# Patient Record
Sex: FEMALE | Race: WHITE | Employment: UNEMPLOYED | URBAN - METROPOLITAN AREA
[De-identification: names, ages, dates, MRNs, and addresses within clinical notes are randomized per-mention and may not be internally consistent; named-entity substitution may affect disease eponyms.]

---

## 2018-10-25 ENCOUNTER — OFFICE VISIT (OUTPATIENT)
Dept: NEUROLOGY | Age: 38
End: 2018-10-25

## 2018-10-25 VITALS
HEIGHT: 68 IN | WEIGHT: 133.2 LBS | SYSTOLIC BLOOD PRESSURE: 110 MMHG | DIASTOLIC BLOOD PRESSURE: 68 MMHG | BODY MASS INDEX: 20.19 KG/M2 | OXYGEN SATURATION: 99 % | HEART RATE: 83 BPM

## 2018-10-25 DIAGNOSIS — M25.20 JOINT LAXITY: Primary | ICD-10-CM

## 2018-10-25 DIAGNOSIS — M79.10 MYALGIA: ICD-10-CM

## 2018-10-25 DIAGNOSIS — G90.A POTS (POSTURAL ORTHOSTATIC TACHYCARDIA SYNDROME): ICD-10-CM

## 2018-10-25 RX ORDER — LEVOTHYROXINE SODIUM 112 UG/1
TABLET ORAL
COMMUNITY

## 2018-10-25 RX ORDER — FLUTICASONE PROPIONATE 50 MCG
2 SPRAY, SUSPENSION (ML) NASAL DAILY
COMMUNITY

## 2018-10-25 NOTE — PATIENT INSTRUCTIONS
A Healthy Lifestyle: Care Instructions  Your Care Instructions    A healthy lifestyle can help you feel good, stay at a healthy weight, and have plenty of energy for both work and play. A healthy lifestyle is something you can share with your whole family. A healthy lifestyle also can lower your risk for serious health problems, such as high blood pressure, heart disease, and diabetes. You can follow a few steps listed below to improve your health and the health of your family. Follow-up care is a key part of your treatment and safety. Be sure to make and go to all appointments, and call your doctor if you are having problems. It's also a good idea to know your test results and keep a list of the medicines you take. How can you care for yourself at home? · Do not eat too much sugar, fat, or fast foods. You can still have dessert and treats now and then. The goal is moderation. · Start small to improve your eating habits. Pay attention to portion sizes, drink less juice and soda pop, and eat more fruits and vegetables. ? Eat a healthy amount of food. A 3-ounce serving of meat, for example, is about the size of a deck of cards. Fill the rest of your plate with vegetables and whole grains. ? Limit the amount of soda and sports drinks you have every day. Drink more water when you are thirsty. ? Eat at least 5 servings of fruits and vegetables every day. It may seem like a lot, but it is not hard to reach this goal. A serving or helping is 1 piece of fruit, 1 cup of vegetables, or 2 cups of leafy, raw vegetables. Have an apple or some carrot sticks as an afternoon snack instead of a candy bar. Try to have fruits and/or vegetables at every meal.  · Make exercise part of your daily routine. You may want to start with simple activities, such as walking, bicycling, or slow swimming. Try to be active 30 to 60 minutes every day. You do not need to do all 30 to 60 minutes all at once.  For example, you can exercise 3 times a day for 10 or 20 minutes. Moderate exercise is safe for most people, but it is always a good idea to talk to your doctor before starting an exercise program.  · Keep moving. Raiza Lyle the lawn, work in the garden, or [facility]. Take the stairs instead of the elevator at work. · If you smoke, quit. People who smoke have an increased risk for heart attack, stroke, cancer, and other lung illnesses. Quitting is hard, but there are ways to boost your chance of quitting tobacco for good. ? Use nicotine gum, patches, or lozenges. ? Ask your doctor about stop-smoking programs and medicines. ? Keep trying. In addition to reducing your risk of diseases in the future, you will notice some benefits soon after you stop using tobacco. If you have shortness of breath or asthma symptoms, they will likely get better within a few weeks after you quit. · Limit how much alcohol you drink. Moderate amounts of alcohol (up to 2 drinks a day for men, 1 drink a day for women) are okay. But drinking too much can lead to liver problems, high blood pressure, and other health problems. Family health  If you have a family, there are many things you can do together to improve your health. · Eat meals together as a family as often as possible. · Eat healthy foods. This includes fruits, vegetables, lean meats and dairy, and whole grains. · Include your family in your fitness plan. Most people think of activities such as jogging or tennis as the way to fitness, but there are many ways you and your family can be more active. Anything that makes you breathe hard and gets your heart pumping is exercise. Here are some tips:  ? Walk to do errands or to take your child to school or the bus.  ? Go for a family bike ride after dinner instead of watching TV. Where can you learn more? Go to http://margie-cassie.info/. Enter B331 in the search box to learn more about \"A Healthy Lifestyle: Care Instructions. \"  Current as of: December 7, 2017  Content Version: 11.8  © 4308-3608 [employer]. Care instructions adapted under license by [employer] (which disclaims liability or warranty for this information). If you have questions about a medical condition or this instruction, always ask your healthcare professional. [address] any warranty or liability for your use of this information. Office Policies  o Phone calls/patient messages:  Please allow up to 24 hours for someone in the office to contact you about your call or message. Be mindful your provider may be out of the office or your message may require further review. We encourage you to use [employer] for your messages as this is a faster, more efficient way to communicate with our office  o Medication Refills:  Prescription medications require up to 48 business hours to process. We encourage you to use [employer] for your refills. For controlled medications: Please allow up to 72 business hours to process. Certain medications may require you to  a written prescription at our office. NO narcotic/controlled medications will be prescribed after 4pm Monday through Friday or on weekends  o Form/Paperwork Completion:  Please note there is a $25 fee for all paperwork completed by our providers. We ask that you allow 7-14 business days. Pre-payment is due prior to picking up/faxing the completed form. You may also download your forms to [employer] to have your doctor print off. Learning About Postural Orthostatic Tachycardia Syndrome (POTS)  What is POTS? Postural orthostatic tachycardia syndrome (POTS) is a fast heart rate (tachycardia) that starts after you stand up. This can suddenly happen as long as 10 minutes after you stand. What happens when you have POTS? With POTS, the body does not control blood pressure or heart rate as it should after you stand up.  So for a brief time, you may not get enough blood to your brain.  People with severe fatigue and dizziness may find it hard to keep up with daily living. But treatment can help. What are the symptoms? POTS can make you feel dizzy and lightheaded. You may faint. You may also feel tired. Blurred vision and feeling anxious are also symptoms. And you may have trouble with keeping your attention focused. Symptoms can range from mild to severe. Some things can make symptoms worse. These include heat, eating, exercise, showering, sitting too long, and menstrual cycle changes. When you first notice symptoms, sitting or lying down may help you feel better. What causes it? POTS may follow a viral illness, a surgery, pregnancy, bed rest, or a severe trauma. Experts don't understand what causes it, but different body systems seem to be out of balance. How is POTS diagnosed? To learn what is causing your symptoms, your doctor may:  · Ask about your symptoms, including when and how they started. · Check how your blood pressure and heart rate change when you move from lying down to sitting to standing. · Do a tilt table test. The test uses a special table that slowly tilts you to an upright position. It checks how your body responds when you change positions. How is POTS treated? Work with your doctor to find the right mix of treatments. These treatments may include:  · Taking medicine prescribed by your doctor. For some people, taking medicine that's normally used for high blood pressure can help. Taking medicine that keeps the body's fluids balanced may also help. · Everyday self-care. These practices can be a turner part of helping the body get back in balance. ? Drink plenty of fluids. For many people, low body fluid is part of what makes POTS symptoms worse. ? Eat the amount of salt your doctor tells you to. Salt helps keep up the body's fluid level. ? Try a special exercise program. Your doctor may give you a program of specific exercises.  You start short and slow, especially if fatigue is a problem. Add a little at a time. At first, you only do exercise when you're reclined. After a few weeks, you start to add upright exercise. ? Keep track of your symptoms and what makes them better and worse. When should you call for help? Call 911 anytime you think you may need emergency care. For example, call if:  · You passed out (lost consciousness), and it feels different than your typical episode or you don't recover as quickly as you have in the past.  Call your doctor now or seek immediate medical care if:  · Your symptoms are getting worse. For example, you are more dizzy or lightheaded. Watch closely for changes in your health, and be sure to contact your doctor if:  · You do not get better as expected. Follow-up care is a key part of your treatment and safety. Be sure to make and go to all appointments, and call your doctor if you are having problems. It's also a good idea to know your test results and keep a list of the medicines you take. Where can you learn more? Go to http://margie-cassie.info/. Enter D942 in the search box to learn more about \"Learning About Postural Orthostatic Tachycardia Syndrome (POTS). \"  Current as of: December 6, 2017  Content Version: 11.8  © 3367-3758 [employer]. Care instructions adapted under license by [employer] (which disclaims liability or warranty for this information). If you have questions about a medical condition or this instruction, always ask your healthcare professional. [place] [address] any warranty or liability for your use of this information.

## 2018-10-25 NOTE — PROGRESS NOTES
NEUROLOGY CLINIC NOTE    Patient ID:  Ky Pruitt  [id number]  56 y.o.  1980    Date of Consultation:  October 25, 2018    Reason for Consultation:  Question of [address] syndrome    Chief Complaint   Patient presents with    New Patient     ? [address] syndrome       History of Present Illness:     Past Medical History:   Diagnosis Date    Thyroid disorder      History reviewed. No pertinent surgical history. Prior to Admission medications    Medication Sig Start Date End Date Taking? Authorizing Provider   levothyroxine (SYNTHROID) 112 mcg tablet Take  by mouth Daily (before breakfast). Yes Provider, Historical   LORATADINE PO Take  by mouth. Yes Provider, Historical   fluticasone (FLONASE) 50 mcg/actuation nasal spray 2 Sprays by Both Nostrils route daily.    Yes Provider, Historical     Allergies   Allergen Reactions    Sudafed [Pseudoephedrine Hcl] Palpitations      Social History     Socioeconomic History    Marital status:      Spouse name: Not on file    Number of children: Not on file    Years of education: Not on file    Highest education level: Not on file   Social Needs    Financial resource strain: Not on file    Food insecurity - worry: Not on file    Food insecurity - inability: Not on file    Transportation needs - medical: Not on file   Mitro needs - non-medical: Not on file   Occupational History    Not on file   Tobacco Use    Smoking status: Never Smoker    Smokeless tobacco: Never Used   Substance and Sexual Activity    Alcohol use: No     Frequency: Never    Drug use: No    Sexual activity: Not on file   Other Topics Concern    Not on file   Social History Narrative    Not on file     Family History   Problem Relation Age of Onset    Cancer Maternal Grandfather     Stroke Maternal Grandfather     Cancer Paternal Grandfather     MS Mother           Subjective:      Ky Pruitt is a 43 y.o. ambidextrous female with history of thyroid disorder who is here for further evaluation regarding a concern if she has Nataliia Danlos Syndrome. Patient's daughter apparently getting worked by a genetics specialist for possible EDS. Patient is concerned whether she also has it. She mentions problems years ago with feeling exhausted always and having difficulty keeping a 40 hour work week. Eventually found to have hypothyroidism. Treatment with supplementation and condition improved. She recalls episode of half of her face going numb and losing her sense of taste. She saw Dr. Primitivo Cruz (neurologist). Noted then as well some random muscle jerking. Taught to be due to medications she was taking. She reports chronic neck and shoulder pain. Episodes of sense of imbalance briefly or wobbliness upon standing up and heart racing with coffee. No issues currently. Outside reports reviewed: none. Review of Systems:    A comprehensive review of systems was performed:   Constitutional: positive for fatigue  Eyes: positive for none  Ears, nose, mouth, throat, and face: positive for ringing in the ears, snoring  Respiratory: positive for none  Cardiovascular: positive for skipped beats  Gastrointestinal: positive for constipation  Genitourinary: positive for none  Integument/breast: positive for none  Hematologic/lymphatic: positive for none  Musculoskeletal: positive for none  Neurological: positive for none  Behavioral/Psych: positive for none  Endocrine: positive for none  Allergic/Immunologic: positive for none      Objective:     Visit Vitals  /68   Pulse 83   Ht 5' 8\" (1.727 m)   Wt 133 lb 3.2 oz (60.4 kg)   SpO2 99%   BMI 20.25 kg/m²       PHYSICAL EXAM:    General Appearance: Alert, patient appears stated age. General:  Well developed, well nourished patent in no apparent distress. Head/Face: The head is normocephalic and atraumatic. Eyes: Conjunctivae appear normal. Sclera appear normal and non-icteric. Nose (and Sinus):    No abnormality of the nose or sinuses is noted. Oral:   Throat is clear. Lymphatics:  No lymphadenopathy in the neck/head. Neck and Thyroid:   No bruits noted in the neck. Respiratory:  Lungs clear to auscultation. Cardiovascular:  Palpation and auscultation: regular rate and rhythm. Extremity: No joint swelling, erythema or pedal edema. NEUROLOGICAL EXAM:    Appearance: The patient is well developed, well nourished, provides a coherent history and is in no acute distress. Mental Status: Oriented to time, place and person. Fluent, no aphasia or dysarthria. Mood and affect appropriate. Cranial Nerves:   Intact visual fields. VA 20/25 OS and 20/20 OD without correction on near vision. Fundi are benign. DIA, EOM's full, no nystagmus, no ptosis. Facial sensation is normal. Corneal reflexes are intact. Facial movement is symmetric. Hearing is normal bilaterally. Palate is midline with normal elevation. Sternocleidomastoid and trapezius muscles are normal. Tongue is midline. Motor:  5/5 strength in upper and lower proximal and distal muscles. Normal bulk and tone. No fasciculations. Reflexes:   Deep tendon reflexes 2+/4 and symmetrical. Downgoing toes. Sensory:   Normal to touch, pinprick and vibration. Gait:  Normal gait. No Romberg. Can do heel/toe/tandem walking. Tremor:   No tremor noted. Cerebellar:  Intact FTN/JUAN/HTS. Neurovascular:  Normal heart sounds and regular rhythm, peripheral pulses intact, and no carotid bruits.     Cervical range of motion measurement:       Degrees   Head flexion   70   Head extension  75   Right lateral head flexion 25   Left lateral head flexion 60   Right head rotation  75   Left head rotation  90    Mild restriction right head rotation and moderate restriction right lateral head flexion  Anterior head posture with shoulders rotated in        Assessment:   Joint laxity  Myalgia  POTS    Plan:   Neurological examination is nonfocal.  Patient exhibits some joint laxity. Patient also prone to myalgia. Exam reveals anterior head posture with restriction and right lateral head flexion and head rotation on range of motion measurements. Advised to correct her anterior head posture as this may increase her risk to develop cervicogenic headaches. Given that her daughter is currently being worked up, it is only prudent that the patient also be evaluated for possible Nataliia Danlos syndrome. Patient was referred to [facility] genetics care of Dr. Sirena Duron for further evaluation. Supine blood pressure was 128/86 with a heart rate of 76. Sitting blood pressure of 124/92 with a heart rate of 86. Standing blood pressure was 118/91 with a heart rate of 98. There was a 22 point increase in patient's heart rate from supine to standing. Only a 10 point drop in systolic blood pressure. Need to consider given her other symptoms of possible postural orthostatic tachycardia syndrome. Patient was referred to Dr. Ange Baig with [facility] nephrology for further evaluation. No further workup currently from a neurological standpoint. Patient to return to clinic as needed. Visit lasted 45 minutes.   Greater than 50% was spent discussing her condition, potential etiology, prognosis, orthostatic testing and its implication, discussion about Jenna Knuckles syndrome, referral to [facility] genetics and referral to Dr. Ange Baig for possible POTS

## 2018-10-31 ENCOUNTER — DOCUMENTATION ONLY (OUTPATIENT)
Dept: NEUROLOGY | Age: 38
End: 2018-10-31

## 2018-10-31 NOTE — PROGRESS NOTES
Faxed on 10/30/2018 to Dr. Mayra Piña at [phone] Referral Requisition, Clinic Note, Patient Registration, Insurance Card Image

## 2018-10-31 NOTE — PROGRESS NOTES
Faxed on 10/30/2018 to Dr. Pita Good at [phone] Referral Requisition, Clinic Note, Patient Registration, Insurance Card Image.

## 2018-11-05 ENCOUNTER — TELEPHONE (OUTPATIENT)
Dept: NEUROLOGY | Age: 38
End: 2018-11-05

## 2018-11-05 NOTE — TELEPHONE ENCOUNTER
Received call from patient, she stated that at her last appt with Dr. Fatuma Knight, he referred her to two doctors. She said that Dr. Suyapa Kearney office cannot get her in until November 2019, and Dr. Sarahi Triplett office cannot get her in until March 2019. She wants to know if it's possible for someone to call to see if she can be seen sooner, or can Dr. Fatuma Knight refer her to other doctors.       [phone]

## 2018-11-08 NOTE — TELEPHONE ENCOUNTER
Dr. Afia Mccormack spoke with patient and she is willing to go to [facility] for the [facility] and [facility] at [facility] for the POTS. Please advise.

## 2018-11-09 DIAGNOSIS — G90.A POTS (POSTURAL ORTHOSTATIC TACHYCARDIA SYNDROME): ICD-10-CM

## 2018-11-09 DIAGNOSIS — M24.9 HYPERMOBILITY OF JOINT: Primary | ICD-10-CM

## 2018-11-12 ENCOUNTER — TELEPHONE (OUTPATIENT)
Dept: NEUROLOGY | Age: 38
End: 2018-11-12

## 2018-11-12 NOTE — TELEPHONE ENCOUNTER
----- Message from Beronica Toussaint sent at 11/12/2018  8:52 AM EST -----  Regarding: Dr. Nicolas Dominguez  The patient is requesting a call back with the status of the appointments that are supposed to be schedule for her out of town.  [phone]

## 2018-11-13 ENCOUNTER — TELEPHONE (OUTPATIENT)
Dept: NEUROLOGY | Age: 38
End: 2018-11-13

## 2018-11-13 NOTE — TELEPHONE ENCOUNTER
Spoke with patient and I was unable to make her appointment, but I am mailing out to patient a copy of both referrals and information attach to each test she is going to have done.

## 2018-11-14 ENCOUNTER — TELEPHONE (OUTPATIENT)
Dept: NEUROLOGY | Age: 38
End: 2018-11-14

## 2018-11-14 ENCOUNTER — DOCUMENTATION ONLY (OUTPATIENT)
Dept: NEUROLOGY | Age: 38
End: 2018-11-14

## 2018-11-14 DIAGNOSIS — G90.A POTS (POSTURAL ORTHOSTATIC TACHYCARDIA SYNDROME): Primary | ICD-10-CM

## 2018-11-14 NOTE — TELEPHONE ENCOUNTER
Spoke with patient and I will let Dr. Vignesh Veras known that [facility] (POTS) has no opening unitl November 2019.

## 2018-11-14 NOTE — PROGRESS NOTES
Faxed on 11/12/2018 to Laureen Shepherd Dr at [phone] Referral Requisition, Clinic Note, [employer], Patient Registration.

## 2018-11-14 NOTE — TELEPHONE ENCOUNTER
----- Message from [facility] sent at 11/14/2018  8:38 AM EST -----  Regarding: Dr Andrea Joshi telephone  The pt called and stated that both POTS specialist are booked out until November 2019.       Best contact number is [phone]

## 2018-11-14 NOTE — PROGRESS NOTES
Faxed on 11/12/2018 to [address] [place] [address] at [phone] Referral Requisition, Clinic Note, [employer], Patient Registration.

## 2018-11-14 NOTE — TELEPHONE ENCOUNTER
Dr. Goodrich Bolds patient states that Raphael Terry and [address] for (POTS) will not be available until November 2019. Please advise.

## 2018-11-16 NOTE — TELEPHONE ENCOUNTER
Dr. Charlie Dimas whom would you like to refer patient to because [facility] have not appointment until November 2019. Please advise.

## 2018-11-16 NOTE — TELEPHONE ENCOUNTER
Spoke with patient per Dr. Bea Segura I will send to autonomic testing at the Neurology clinic at [facility] and than see what the testing results show and likely refer her to cardiologist. Patient understood.

## 2018-11-16 NOTE — TELEPHONE ENCOUNTER
Please tell patient I will send her for autonomic testing at the Neurology clinic at [facility] and then see what the testing results show and likely refer her to a cardiologist.

## 2018-11-26 ENCOUNTER — TELEPHONE (OUTPATIENT)
Dept: NEUROLOGY | Age: 38
End: 2018-11-26

## 2018-11-26 NOTE — TELEPHONE ENCOUNTER
Spoke with patient; patient is concerned about not receiving a phone call to schedule an appointment.      Spoke with Dr. Álvaro Marte and he stated the referral is supposed to go to Dr. Niko Delgado   Physician   Specialty   Neurology   Niko Delgado   Physician   Primary Contact Information     Phone Fax E-mail Address   [phone] Not available Niurka On license of [facility] [address] [address]

## 2018-11-30 ENCOUNTER — TELEPHONE (OUTPATIENT)
Dept: NEUROLOGY | Age: 38
End: 2018-11-30

## 2018-11-30 NOTE — TELEPHONE ENCOUNTER
----- Message from Maksim Carson sent at 11/30/2018 10:15 AM EST -----  Regarding: Dr Melly Conte telephone  The pt is requesting a callback, because she wants an update on the testing needed. She is needing to know if someone spoke with her insurance regarding approval; also the insurance told her a call would need to be made because they aren't accepting faxes.       Best contact number is [phone]

## 2018-12-05 ENCOUNTER — TELEPHONE (OUTPATIENT)
Dept: NEUROLOGY | Age: 38
End: 2018-12-05

## 2018-12-05 NOTE — TELEPHONE ENCOUNTER
Called and spoke to patient scheduled ans testing   Thursday, December 13, 2018 01:00 PM  Instructions given to hold all oral medications for 48 hrs   Patient verbalized understanding

## 2018-12-05 NOTE — TELEPHONE ENCOUNTER
----- Message from Khushi Cristina sent at 12/5/2018 12:50 PM EST -----  Regarding: Dr. Kaushik Castaneda   Pt requested a call back with clarification on requirements and details that she should be aware of prior to ANS testing next week. Best contact [phone].

## 2018-12-05 NOTE — TELEPHONE ENCOUNTER
----- Message from Ember Mohr sent at 12/5/2018  2:51 PM EST -----  Regarding: Dr Dane Kidd, [facility], is following up on a pre-certification for ANS testing, provider has informed pt that it is not needed, but they have a pending Pre-Certification on file. Best contact [phone]-  Please leave detailed message if not available.

## 2018-12-11 ENCOUNTER — TELEPHONE (OUTPATIENT)
Dept: NEUROLOGY | Age: 38
End: 2018-12-11

## 2018-12-11 NOTE — TELEPHONE ENCOUNTER
Patient called stating that she received phone call from the benefits department for her insurance company stating that the ANS testing was going to be denied by her insurance, but could do an appeal for the PA. Patient was told that the PA was not required. Patient's ANS is scheduled for 12/13/13 @ 1:00p. Patient stated that if insurance will not cover it even after appeal, she would rather just get it done and pay full price. Wanted to know what out of pocket cost would be. Has f/u with Dr. Joleen Manuel at [facility] to go over results of test. Please call patient as soon as possible with update on PA/cost of procedure.  [phone]

## 2018-12-11 NOTE — TELEPHONE ENCOUNTER
----- Message from Rubén Cobianronak sent at 12/11/2018  1:29 PM EST -----  Regarding: Dr. Connell Labor  Pt would like to discuss ans testing being denied. She has few questions. [phone]. Insurance stated they are denying it, and marked it unnecessary. She would  like to know what's going on,stated  and the office and insurance telling her different things.

## 2018-12-12 NOTE — TELEPHONE ENCOUNTER
Called and spoke to patient insurance co states no pa   She states understanding   Gave her the cash cost approx $783  Patient states she will continue with appt  Even if she had to pay cash and insurance wont pay

## 2018-12-13 ENCOUNTER — OFFICE VISIT (OUTPATIENT)
Dept: NEUROLOGY | Age: 38
End: 2018-12-13

## 2018-12-13 ENCOUNTER — TELEPHONE (OUTPATIENT)
Dept: NEUROLOGY | Age: 38
End: 2018-12-13

## 2018-12-13 DIAGNOSIS — R00.0 TACHYCARDIA, UNSPECIFIED: Primary | ICD-10-CM

## 2018-12-13 NOTE — LETTER
2018 5:57 PM 
 
Patient:  Jeramie Brumfield YOB: 1980 Date of Visit: 2018 Dear Abbe Gallagher MD 
[address] [place] [address] [address] [address] VIA Facsimile: [phone] Colin Jackson MD 
[address] [facility] [address] [address] VIA In Basket Emil Velasquez MD 
[address] [address] VIA Facsimile: [phone] 
 : Thank you for referring Ms. Sebastián Faye to me for ANS testing. [facility] [place] [address], [address] [place], [address] [place] [address] Phone: [phone] FAX: [phone] Clinical Autonomic Testing Report Patient ID: 
Jeramie Brumfield [id number] 
43 y.o. 
1980 REFERRED BY: Yara Arroyo MD 
PCP: Taz Tucker MD 
 
Date of Testin2018 Indication/History:   
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 Patient also has generalized fatigue with palpitations Patient is coming for syncope/autonomic dysfunction evaluation. Medications taken 48 hrs before the test: None Procedure: This Autonomic Nervous System (ANS) testing is performed by utilizing [address] [place] [address] Autonomic System, with established protocol. Multiple procedures performed: Heart rate response to deep breathing (HRDB), Valsalva ratio, Heart rate and BP response to head up tilt (HUT), and Quantitative sudomotor axon reflex testing (QSWEAT) . Result: 1. Heart response to deep  breathing (HRDB): 2 series of 6 cycles were performed and the mean of 6 consecutive cycles was obtained. Average HR difference was 27.6 and E:I ratio was 1.40. Normal response 2. Heart rate response to Valsalva maneuver:  hwka-mc-[id number] BP to Valsalva was measured and BP response in all 4 phases was normal. Heart response was the opposite of BP, a normal response. ( VR = 1.80 ) 3.  HUT (head-up tilt) : Zifr-md-viwi BP and HR were measured, up to 15 minutes post tilt. There is an exaggerated sustained increase in heart rate greater than 30 beats/min (from baseline 78.4 to max 122) associated with dizziness without accompanying hypotension within the first 10 mins of tilt table testing 4. SUDOMOTOR: QSWEAT response showed reduced sweat sweat production in the proximal leg comparing patient to age group. Impression: ABNORMAL 
  
1) There is an exaggerated sustained increase in heart rate greater than 30 beats/min  (from baseline 78.4 to max 122) associated with dizziness without accompanying hypotension within the first 10 mins of tilt table testing consistent with postural orthostatic tachycardia syndrome (POTS)   
2) Reduced sweat production in the proximal leg which suggests a neuropathic type of POTS. Adrienne Simms MD 
Diplomate, American Board of Psychiatry and Neurology Diplomate, Neuromuscular Medicine Diplomate, [address] [place] [address] Board of Electrodiagnostic Medicine Note: Raw Data will be scanned separately in Media

## 2018-12-13 NOTE — TELEPHONE ENCOUNTER
Received call from patient, she stated that she had testing does at [facility], and would like a call back with the results. She also said that she needs the results printed out so she can take them to another upcoming appt that she has.       [phone]

## 2018-12-13 NOTE — PROCEDURES
[facility]  [address] [place], [address] [place], [address] [facility]  Phone: [phone]  FAX: [phone]     Clinical Autonomic Testing Report     Patient ID:  Esvin Martinez  [id number]  66 y.o.  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 Patient also has generalized fatigue with palpitations    Patient is coming for syncope/autonomic dysfunction evaluation. Medications taken 48 hrs before the test: None     Procedure: This Autonomic Nervous System (ANS) testing is performed by utilizing [address] [place] [address] [facility] Autonomic System, with established protocol. Multiple procedures performed: Heart rate response to deep breathing (HRDB), Valsalva ratio, Heart rate and BP response to head up tilt (HUT), and Quantitative sudomotor axon reflex testing (QSWEAT) . Result:  1. Heart response to deep  breathing (HRDB): 2 series of 6 cycles were performed and the mean of 6 consecutive cycles was obtained. Average HR difference was 27.6 and E:I ratio was 1.40. Normal response  2. Heart rate response to Valsalva maneuver:  khwe-pn-mzjx BP to Valsalva was measured and BP response in all 4 phases was normal. Heart response was the opposite of BP, a normal response. ( VR = 1.80 )  3. HUT (head-up tilt) : Jcpm-dl-gfek BP and HR were measured, up to 15 minutes post tilt. There is an exaggerated sustained increase in heart rate greater than 30 beats/min (from baseline 78.4 to max 122) associated with dizziness without accompanying hypotension within the first 10 mins of tilt table testing   4. SUDOMOTOR: QSWEAT response showed reduced sweat sweat production in the proximal leg comparing patient to age group.      Impression:   ABNORMAL     1) There is an exaggerated sustained increase in heart rate greater than 30 beats/min (from baseline 78.4 to max 122) associated with dizziness without accompanying hypotension within the first 10 mins of tilt table testing consistent with postural orthostatic tachycardia syndrome (POTS)     2) Reduced sweat production in the proximal leg which suggests a neuropathic type of POTS.          Liliana Perez MD  Diplomate, American Board of Psychiatry and Neurology  Diplomate, Neuromuscular Medicine  Diplomate, American Board of Electrodiagnostic Medicine    Note: Raw Data will be scanned separately in Media

## 2018-12-14 NOTE — TELEPHONE ENCOUNTER
Called patient today and informed her that the autonomic testing was positive for POTS. She is scheduled to be evaluated at [facility]. She will pass by a copy of the autonomic testing either today or Monday.

## 2021-12-03 ENCOUNTER — TRANSCRIBE ORDER (OUTPATIENT)
Dept: SCHEDULING | Age: 41
End: 2021-12-03

## 2021-12-03 DIAGNOSIS — R19.4 FREQUENT BOWEL MOVEMENTS: Primary | ICD-10-CM

## 2021-12-03 DIAGNOSIS — K59.00 CONSTIPATION: ICD-10-CM

## 2022-08-22 ENCOUNTER — TRANSCRIBE ORDER (OUTPATIENT)
Dept: SCHEDULING | Age: 42
End: 2022-08-22

## 2022-08-22 DIAGNOSIS — K59.00 CONSTIPATION: ICD-10-CM

## 2022-08-22 DIAGNOSIS — R19.4 FREQUENT BOWEL MOVEMENTS: Primary | ICD-10-CM

## 2024-02-12 ENCOUNTER — OFFICE VISIT (OUTPATIENT)
Age: 44
End: 2024-02-12
Payer: COMMERCIAL

## 2024-02-12 VITALS
HEART RATE: 93 BPM | BODY MASS INDEX: 20.95 KG/M2 | HEIGHT: 68 IN | SYSTOLIC BLOOD PRESSURE: 118 MMHG | WEIGHT: 138.2 LBS | DIASTOLIC BLOOD PRESSURE: 85 MMHG

## 2024-02-12 DIAGNOSIS — E03.9 ACQUIRED HYPOTHYROIDISM: Primary | ICD-10-CM

## 2024-02-12 DIAGNOSIS — E55.9 VITAMIN D DEFICIENCY: ICD-10-CM

## 2024-02-12 PROCEDURE — 99204 OFFICE O/P NEW MOD 45 MIN: CPT | Performed by: GENERAL ACUTE CARE HOSPITAL

## 2024-02-12 RX ORDER — CALCIUM CARBONATE 500(1250)
500 TABLET ORAL DAILY
COMMUNITY

## 2024-02-12 RX ORDER — ASCORBIC ACID 500 MG
500 TABLET ORAL DAILY
COMMUNITY

## 2024-02-12 RX ORDER — CHOLECALCIFEROL (VITAMIN D3) 125 MCG
500 CAPSULE ORAL DAILY
COMMUNITY

## 2024-02-12 RX ORDER — LEVOTHYROXINE SODIUM 0.12 MG/1
TABLET ORAL
COMMUNITY

## 2024-02-12 RX ORDER — MULTIVITAMIN WITH IRON
100 TABLET ORAL DAILY
COMMUNITY

## 2024-02-12 RX ORDER — LORATADINE 10 MG/1
10 TABLET ORAL DAILY
COMMUNITY

## 2024-02-12 RX ORDER — CHOLECALCIFEROL (VITAMIN D3) 50 MCG
2000 TABLET ORAL DAILY
Qty: 30 TABLET | Refills: 11 | Status: SHIPPED | OUTPATIENT
Start: 2024-02-12

## 2024-02-12 RX ORDER — LUTEIN 6 MG
TABLET ORAL
COMMUNITY

## 2024-02-12 RX ORDER — VITAMIN E 268 MG
400 CAPSULE ORAL DAILY
COMMUNITY

## 2024-02-12 NOTE — PROGRESS NOTES
[facility]  DR ELIZABETH FLORES         Yamini Torres is a 44 y.o. female  has a past medical history of Acquired hypothyroidism, Endocrine disease, Hypothyroid, and Thyroid disorder.     Presents for : Hypothyroidism    ASSESSMENT AND PLAN:     Hypothyroidism    Today, we spent time discussing the physiologic mechanisms which govern thyroid hormone regulation and the normal responses to abnormal thyroid function. We also discussed their current condition in the setting of this physiologic response.     Today we will check a TSH & FT4 level to determine if patient is euthyroid on current regimen.  Continue with 125 mcg of levothyroxine daily.  Discussed the best way to take thyroid hormone each morning.    Vitamin D deficiency   Per patient last labs showing Vitamin D deficiency, taking only 400 units at this time OTC, advised to obtain labs today and take 2000 units daily    Plan to RTC in 4 months with prelabs    -----------------------------------------------------------------------------------------------------------------------------------------------------      HISTORY OF PRESENT ILLNESS:   Yamini Torres is a 44 y.o. female with a PMHx as noted below who was referred to our endocrinology clinic for evaluation of hypothyroidism.    Thyroid History:  Diagnosed since she was 18 years old  Patient denies  history of trauma/surgery   Currently taking levothyroxine 125 mcg daily   The patient admits to taking it 1 hour before breakfast on an empty stomach, not drinking water with it thought, swallows it dry  Since last 3 years has been on laxatives for constipation, she has constipation for her entire life, despite staying well hydrated, had colonoscopy. Also was placed on linzess that did not work, would use for about 1 month and discontinue it as she did not see results.   Last blood test checked by PCP due to worsening symptoms, last done on Fall 2023, we do not have report

## 2024-02-12 NOTE — PATIENT INSTRUCTIONS
Take levothyroxine 125 mcg daily   Obtain labs today   Obtain labs 1 week prior to next visit     Levothyroxine medication instructions:  Please take levothyroxine with a glass of water only, on an empty stomach each morning, 1 hour prior to ingesting any other medications (including vitamins), food, coffee, tea, juice or any other beverages.   If you use antacids, medicine to treat high cholesterol (including cholestyramine, colesevelam, colestipol), orlistat, sevelamer, sucralfate, stomach medicine (including lansoprazole, omeprazole, pantoprazole), or any medicine that contains calcium or iron, please take it at least 4 hours before or 4 hours after you take levothyroxine.  Cottonseed meal, dietary fiber, soybean flour or walnuts may decrease the absorption of this medicine.  All of these can reduce the absorption of thyroid hormone tablets and result in fluctuating levels in the blood and on labs, affecting also how one feels.  Please do not eat grapefruit or drink grapefruit juice while you are using this medicine.  If you miss a dose you can take it as soon as you remember. However, if it is almost time for your next dose, wait until then and take a regular dose. Do not take extra medicine to make up for a missed dose.  Store the medicine in a closed container at room temperature, away from heat, moisture, and direct light. Please keep out of children's reach.  You may have to take this medicine for 6 to 8 weeks before your symptoms start to get better.

## 2024-02-13 ENCOUNTER — OFFICE VISIT (OUTPATIENT)
Age: 44
End: 2024-02-13
Payer: COMMERCIAL

## 2024-02-13 VITALS
HEIGHT: 68 IN | WEIGHT: 134.6 LBS | HEART RATE: 93 BPM | DIASTOLIC BLOOD PRESSURE: 60 MMHG | RESPIRATION RATE: 17 BRPM | TEMPERATURE: 97.4 F | OXYGEN SATURATION: 100 % | BODY MASS INDEX: 20.4 KG/M2 | SYSTOLIC BLOOD PRESSURE: 125 MMHG

## 2024-02-13 DIAGNOSIS — E11.42 DIABETIC PERIPHERAL NEUROPATHY ASSOCIATED WITH TYPE 2 DIABETES MELLITUS (HCC): ICD-10-CM

## 2024-02-13 DIAGNOSIS — G56.03 CARPAL TUNNEL SYNDROME, BILATERAL UPPER LIMBS: ICD-10-CM

## 2024-02-13 DIAGNOSIS — R93.0 ABNORMAL MRI OF HEAD: ICD-10-CM

## 2024-02-13 DIAGNOSIS — M48.02 DEGENERATIVE CERVICAL SPINAL STENOSIS: ICD-10-CM

## 2024-02-13 DIAGNOSIS — D89.89 IMMUNE-MEDIATED NEUROPATHY (HCC): ICD-10-CM

## 2024-02-13 DIAGNOSIS — E53.8 B12 DEFICIENCY: ICD-10-CM

## 2024-02-13 DIAGNOSIS — I65.23 BILATERAL CAROTID ARTERY STENOSIS: ICD-10-CM

## 2024-02-13 DIAGNOSIS — Z51.81 THERAPEUTIC DRUG MONITORING: ICD-10-CM

## 2024-02-13 DIAGNOSIS — H93.11 TINNITUS AURIUM, RIGHT: ICD-10-CM

## 2024-02-13 DIAGNOSIS — K59.2 CONSTIPATION DUE TO NEUROGENIC BOWEL: ICD-10-CM

## 2024-02-13 DIAGNOSIS — G63 IMMUNE-MEDIATED NEUROPATHY (HCC): ICD-10-CM

## 2024-02-13 DIAGNOSIS — R53.1 GENERALIZED WEAKNESS: ICD-10-CM

## 2024-02-13 DIAGNOSIS — K59.00 CONSTIPATION DUE TO NEUROGENIC BOWEL: ICD-10-CM

## 2024-02-13 DIAGNOSIS — M47.22 CERVICAL RADICULOPATHY DUE TO DEGENERATIVE JOINT DISEASE OF SPINE: Primary | ICD-10-CM

## 2024-02-13 PROCEDURE — 99205 OFFICE O/P NEW HI 60 MIN: CPT | Performed by: PSYCHIATRY & NEUROLOGY

## 2024-02-14 NOTE — PROGRESS NOTES
Consult  REFERRED BY:  Avtar Cronin MD    CHIEF COMPLAINT: Patient with numbness and tingling in her arms and legs, pulsatile tinnitus in the right ear, and abnormal MRI scan of the brain in the past.      Subjective:     Yamini Torres is a 44 y.o. right-handed  female we are seeing as a new patient, at the request of Dr. Cronin for multiple neurological complaints that she has had for the last 20 years.  She apparently had some arm spasticity and weakness and numbness in her arms and legs, and was seen by Dr. Liam Albarran at [facility] 20 some years ago, and apparently was told that she had 2 lesions on her brain, but never had follow-up for that, and has no idea what they were.  She is also complaining of generalized weakness and fatigue, and feels that she was tried on 2 antidepressants thinking that was the cause of her symptoms, but then they found that her thyroid was low and she is currently on thyroid.  She still feels funny feelings in her arms, but no longer has the spasticity she says, but is concerned that the antidepressants they gave her caused some damage.  She has normal bladder function but says her bowels do not move and she is chronically constipated and GI cannot figure out why we want to do a gastric motility test and we advised her they probably could to see if she has some type of gastroparesis.  She has numbness and tingling in her hands and feet right frequently, and has a lot of neck pain and her neck pops in and out of place, and she is convinced she has something wrong with her neck and her brother has the same problem.  She has significant headaches, and is concerned about the lesions in her brain and her exposure to antidepressants in the past.  She has pulsatile tinnitus in her right ear in addition.  She feels dizzy and off balance quite frequently.  She had previously seen Dr. Massey to slow years ago, but those records are not available.  She had

## 2024-02-23 ENCOUNTER — TELEPHONE (OUTPATIENT)
Age: 44
End: 2024-02-23

## 2024-02-23 NOTE — TELEPHONE ENCOUNTER
Patient would like a call back to discuss corrects for the [employer] portal. Pt states that there a names of doctor that she has never seen before listed. Please call [phone]

## 2024-02-27 NOTE — TELEPHONE ENCOUNTER
[facility] corrects were made.  Call just an FYI to let her know.    Eric Barth MD sent to Margarita Contreras LPN  Caller: Unspecified (4 days ago,  1:55 PM)    Margarita, tell her the Dr. Mitch contreras was a dictation error, per Dr. Maguire and I have corrected that, and the handedness

## 2024-02-27 NOTE — TELEPHONE ENCOUNTER
Spoke with patient.  Verified patient with two patient identifiers.    States she wants to have the corrections made in her chart.     She is left handed.   She has seen Dr. Liam Albarran (which is noted)  She has seen Dr. Brower years ago  She did not see Dr. Massey    Also had symptoms in her 20's   Was on two different anti-depressants which she only took each one for 2 two weeks.  Had spasticity in arms which lasted a year.  Had loss of feeling on one side of mouth.  She later realized this was found to be her thyroid and she did not need the anti-depressants.  She recently watched a [employer] which advised her to call them if she had side effects from the anti-depressants, but now was told it was her thyroid, not an allergic reaction.  States her chart states there is a history of anti-depressants and she wonders if this really qualifies as being a history since she only took them for 2 wks.  Will forward to Dr. Barth.    Patient verbalized understanding.

## 2024-03-14 LAB
25(OH)D3+25(OH)D2 SERPL-MCNC: 31.4 NG/ML (ref 30–100)
CENTROMERE B AB SER-ACNC: <0.2 AI (ref 0–0.9)
CHROMATIN AB SERPL-ACNC: <0.2 AI (ref 0–0.9)
CK SERPL-CCNC: 34 U/L (ref 32–182)
DSDNA AB SER-ACNC: <1 IU/ML (ref 0–9)
ENA JO1 AB SER-ACNC: <0.2 AI (ref 0–0.9)
ENA RNP AB SER-ACNC: 0.2 AI (ref 0–0.9)
ENA SCL70 AB SER-ACNC: <0.2 AI (ref 0–0.9)
ENA SM AB SER-ACNC: <0.2 AI (ref 0–0.9)
ENA SM+RNP AB SER-ACNC: <0.2 AI (ref 0–0.9)
ENA SS-A AB SER-ACNC: <0.2 AI (ref 0–0.9)
ENA SS-B AB SER-ACNC: <0.2 AI (ref 0–0.9)
ERYTHROCYTE [SEDIMENTATION RATE] IN BLOOD BY WESTERGREN METHOD: 4 MM/HR (ref 0–32)
FOLATE SERPL-MCNC: 5.1 NG/ML
HBA1C MFR BLD: 5.5 % (ref 4.8–5.6)
Lab: NORMAL
RIBOSOMAL P AB SER-ACNC: <0.2 AI (ref 0–0.9)
T4 FREE SERPL-MCNC: 1.92 NG/DL (ref 0.82–1.77)
TSH SERPL DL<=0.005 MIU/L-ACNC: 0.9 UIU/ML (ref 0.45–4.5)
VIT B12 SERPL-MCNC: 748 PG/ML (ref 232–1245)

## 2024-03-15 LAB
THYROGLOB AB SERPL-ACNC: <1 IU/ML (ref 0–0.9)
THYROPEROXIDASE AB SERPL-ACNC: 48 IU/ML (ref 0–34)

## 2024-03-19 ENCOUNTER — TRANSCRIBE ORDERS (OUTPATIENT)
Facility: HOSPITAL | Age: 44
End: 2024-03-19

## 2024-03-19 DIAGNOSIS — N64.4 BREAST PAIN: Primary | ICD-10-CM

## 2024-03-19 LAB
ALBUMIN SERPL ELPH-MCNC: 4.2 G/DL (ref 2.9–4.4)
ALBUMIN/GLOB SERPL: 1.5 {RATIO} (ref 0.7–1.7)
ALPHA1 GLOB SERPL ELPH-MCNC: 0.2 G/DL (ref 0–0.4)
ALPHA2 GLOB SERPL ELPH-MCNC: 0.7 G/DL (ref 0.4–1)
B-GLOBULIN SERPL ELPH-MCNC: 1 G/DL (ref 0.7–1.3)
GAMMA GLOB SERPL ELPH-MCNC: 1.2 G/DL (ref 0.4–1.8)
GLOBULIN SER-MCNC: 3 G/DL (ref 2.2–3.9)
IGA SERPL-MCNC: 218 MG/DL (ref 87–352)
IGG SERPL-MCNC: 1059 MG/DL (ref 586–1602)
IGM SERPL-MCNC: 200 MG/DL (ref 26–217)
INTERPRETATION SERPL IEP-IMP: NORMAL
LABORATORY COMMENT REPORT: NORMAL
M PROTEIN SERPL ELPH-MCNC: NORMAL G/DL
PROT SERPL-MCNC: 7.2 G/DL (ref 6–8.5)

## 2024-03-20 LAB
ANTI NEURONAL CELL AB METHOD: NORMAL
INTERPRETATION: NORMAL
INTERPRETATION: NORMAL
MAG IGM SER-ACNC: <900 BTU (ref 0–999)
NEURONAL AB SER IA-ACNC: 17 UNITS (ref 0–54)

## 2024-03-21 ENCOUNTER — TELEPHONE (OUTPATIENT)
Age: 44
End: 2024-03-21

## 2024-03-21 NOTE — TELEPHONE ENCOUNTER
Patient called her her lab results.  She stated that she can see they posted on line and some of her numbers are elevated.  She would like to know what it means and what is the next step?

## 2024-03-22 ENCOUNTER — PATIENT MESSAGE (OUTPATIENT)
Age: 44
End: 2024-03-22

## 2024-03-25 ENCOUNTER — HOSPITAL ENCOUNTER (OUTPATIENT)
Facility: HOSPITAL | Age: 44
Discharge: HOME | End: 2024-03-28
Attending: PSYCHIATRY & NEUROLOGY
Payer: COMMERCIAL

## 2024-03-25 DIAGNOSIS — R93.0 ABNORMAL MRI OF HEAD: ICD-10-CM

## 2024-03-25 DIAGNOSIS — R53.1 GENERALIZED WEAKNESS: ICD-10-CM

## 2024-03-25 DIAGNOSIS — M48.02 DEGENERATIVE CERVICAL SPINAL STENOSIS: ICD-10-CM

## 2024-03-25 DIAGNOSIS — M47.22 CERVICAL RADICULOPATHY DUE TO DEGENERATIVE JOINT DISEASE OF SPINE: ICD-10-CM

## 2024-03-25 DIAGNOSIS — H93.11 TINNITUS AURIUM, RIGHT: ICD-10-CM

## 2024-03-25 PROCEDURE — 72156 MRI NECK SPINE W/O & W/DYE: CPT

## 2024-03-25 PROCEDURE — 70553 MRI BRAIN STEM W/O & W/DYE: CPT

## 2024-03-25 PROCEDURE — A9579 GAD-BASE MR CONTRAST NOS,1ML: HCPCS | Performed by: PSYCHIATRY & NEUROLOGY

## 2024-03-25 PROCEDURE — 6360000004 HC RX CONTRAST MEDICATION: Performed by: PSYCHIATRY & NEUROLOGY

## 2024-03-25 RX ADMIN — GADOTERIDOL 12 ML: 279.3 INJECTION, SOLUTION INTRAVENOUS at 10:30

## 2024-03-26 ENCOUNTER — CLINICAL DOCUMENTATION (OUTPATIENT)
Age: 44
End: 2024-03-26

## 2024-03-27 ENCOUNTER — PROCEDURE VISIT (OUTPATIENT)
Age: 44
End: 2024-03-27
Payer: COMMERCIAL

## 2024-03-27 DIAGNOSIS — R20.0 NUMBNESS AND TINGLING OF BOTH UPPER EXTREMITIES: ICD-10-CM

## 2024-03-27 DIAGNOSIS — D89.89 IMMUNE-MEDIATED NEUROPATHY (HCC): ICD-10-CM

## 2024-03-27 DIAGNOSIS — G56.03 CARPAL TUNNEL SYNDROME, BILATERAL UPPER LIMBS: ICD-10-CM

## 2024-03-27 DIAGNOSIS — R93.0 ABNORMAL MRI OF HEAD: Primary | ICD-10-CM

## 2024-03-27 DIAGNOSIS — R20.2 NUMBNESS AND TINGLING OF BOTH UPPER EXTREMITIES: ICD-10-CM

## 2024-03-27 DIAGNOSIS — M47.22 CERVICAL RADICULOPATHY DUE TO DEGENERATIVE JOINT DISEASE OF SPINE: ICD-10-CM

## 2024-03-27 DIAGNOSIS — R20.0 NUMBNESS AND TINGLING OF BOTH LOWER EXTREMITIES: ICD-10-CM

## 2024-03-27 DIAGNOSIS — R20.2 NUMBNESS AND TINGLING OF BOTH LOWER EXTREMITIES: ICD-10-CM

## 2024-03-27 DIAGNOSIS — G63 IMMUNE-MEDIATED NEUROPATHY (HCC): ICD-10-CM

## 2024-03-27 DIAGNOSIS — G37.9 DEMYELINATING DISEASE OF CENTRAL NERVOUS SYSTEM (HCC): ICD-10-CM

## 2024-03-27 DIAGNOSIS — R53.1 GENERALIZED WEAKNESS: ICD-10-CM

## 2024-03-27 DIAGNOSIS — M48.02 DEGENERATIVE CERVICAL SPINAL STENOSIS: ICD-10-CM

## 2024-03-27 PROBLEM — E11.42 DIABETIC PERIPHERAL NEUROPATHY ASSOCIATED WITH TYPE 2 DIABETES MELLITUS (HCC): Status: RESOLVED | Noted: 2024-02-13 | Resolved: 2024-03-27

## 2024-03-27 PROCEDURE — 95886 MUSC TEST DONE W/N TEST COMP: CPT | Performed by: PSYCHIATRY & NEUROLOGY

## 2024-03-27 PROCEDURE — 95913 NRV CNDJ TEST 13/> STUDIES: CPT | Performed by: PSYCHIATRY & NEUROLOGY

## 2024-03-27 NOTE — PROGRESS NOTES
Patient had a normal EMG and nerve conduction velocity study of both arms and right leg as recorded,

## 2024-03-27 NOTE — PROGRESS NOTES
I called the patient, advised her that the cervical spine just showed mild arthritis, mainly of the neuroforaminal, no real spinal stenosis or surgical lesions, and the MRI of the brain did show some white spots that could be consistent with demyelinating disease but could be microvascular disease related to migraine headaches but she never has headaches, and we told her the options would be to repeat that scan again in about 6 months to follow it to see if there are any active lesions that do develop or do a spinal tap to check for MS spinal fluid abnormalities but she said her mother is never showed up on the spinal fluid she is not anxious to do that would rather just repeat the scan in 6 months.  We will see her tomorrow to do an EMG study to evaluate for neuropathy or radiculopathy in addition.

## 2024-04-25 NOTE — PROCEDURES
ELECTRODIANOSTIC REPORT  Test Date:  3/26/2024    Patient: Yamini Torres : 1980 Physician: Eric Barth M.D.   Sex: Female  < Ref Phys:      Technician: Isa Bentley    Patient History: Patient is a 44-year-old female with a history of cervical arthritis, and numbness in her arms and legs, questionable demyelinating disease, questionable neuropathy, for EMG study to rule out lumbar or cervical radiculopathy and rule out idiopathic or immune related neuropathy.    Neuro Exam: Patient has symmetric and equal reflexes throughout, and no Babinski or clonus present.  Patient has basically normal bulk muscle tone and mass in all extremities.  Patient has normal sensory examination to pin temperature and vibration and double simultaneous stimulation in all extremities.  Patient's cranial nerves II through XII intact and mental status is normal and gait and station is normal.    EMG report: This study shows electrophysiologic evidence of essentially normal EMG and nerve conduction velocity study of both upper extremities and right lower extremity, showing no clear evidence of entrapment neuropathy, motor radiculopathy, polyneuropathy or neuromuscular junction defect or other neuromuscular disease.  The slight decreased amplitudes and slight delayed latencies are probably due to temperature being cool in the room and inadequate warming of the patient.  Clinical correlation recommended, and follow-up studies in 6 to 12 months time may also be of further diagnostic benefit.    EMG & NCV Findings:  Evaluation of the right Fibular motor nerve showed normal distal onset latency (3.9 ms), normal amplitude (3.9 mV), normal conduction velocity (B Fib-Ankle, 47 m/s), and normal conduction velocity (Poplt-B Fib, 50 m/s).  The left median motor and the right median motor nerves showed normal distal onset latency (L3.9, R3.8 ms), normal amplitude (L8.9, R10.4 mV), and normal conduction velocity (Elbow-Wrist, L50, R49 m/s).  done

## 2024-05-29 ENCOUNTER — HOSPITAL ENCOUNTER (OUTPATIENT)
Facility: HOSPITAL | Age: 44
Discharge: HOME | End: 2024-06-01
Payer: COMMERCIAL

## 2024-05-29 DIAGNOSIS — M48.02 DEGENERATIVE CERVICAL SPINAL STENOSIS: ICD-10-CM

## 2024-05-29 PROCEDURE — 72052 X-RAY EXAM NECK SPINE 6/>VWS: CPT

## 2024-06-03 ENCOUNTER — OFFICE VISIT (OUTPATIENT)
Age: 44
End: 2024-06-03
Payer: COMMERCIAL

## 2024-06-03 ENCOUNTER — HOSPITAL ENCOUNTER (OUTPATIENT)
Facility: HOSPITAL | Age: 44
Setting detail: OUTPATIENT SURGERY
Discharge: HOME | End: 2024-06-03
Attending: INTERNAL MEDICINE | Admitting: INTERNAL MEDICINE
Payer: COMMERCIAL

## 2024-06-03 VITALS
TEMPERATURE: 97.8 F | DIASTOLIC BLOOD PRESSURE: 82 MMHG | HEART RATE: 87 BPM | SYSTOLIC BLOOD PRESSURE: 118 MMHG | RESPIRATION RATE: 18 BRPM | OXYGEN SATURATION: 98 % | WEIGHT: 131.2 LBS | HEIGHT: 68 IN | BODY MASS INDEX: 19.88 KG/M2

## 2024-06-03 VITALS
RESPIRATION RATE: 17 BRPM | HEART RATE: 86 BPM | OXYGEN SATURATION: 100 % | SYSTOLIC BLOOD PRESSURE: 155 MMHG | DIASTOLIC BLOOD PRESSURE: 90 MMHG

## 2024-06-03 DIAGNOSIS — Q79.60 EHLERS-DANLOS SYNDROME: ICD-10-CM

## 2024-06-03 DIAGNOSIS — R93.0 ABNORMAL MRI OF HEAD: Primary | ICD-10-CM

## 2024-06-03 DIAGNOSIS — R53.1 GENERALIZED WEAKNESS: ICD-10-CM

## 2024-06-03 DIAGNOSIS — R20.0 NUMBNESS AND TINGLING OF BOTH UPPER EXTREMITIES: ICD-10-CM

## 2024-06-03 DIAGNOSIS — R20.2 NUMBNESS AND TINGLING OF BOTH UPPER EXTREMITIES: ICD-10-CM

## 2024-06-03 DIAGNOSIS — R20.2 NUMBNESS AND TINGLING OF BOTH LOWER EXTREMITIES: ICD-10-CM

## 2024-06-03 DIAGNOSIS — G90.A POTS (POSTURAL ORTHOSTATIC TACHYCARDIA SYNDROME): ICD-10-CM

## 2024-06-03 DIAGNOSIS — R20.0 NUMBNESS AND TINGLING OF BOTH LOWER EXTREMITIES: ICD-10-CM

## 2024-06-03 DIAGNOSIS — M48.02 DEGENERATIVE CERVICAL SPINAL STENOSIS: ICD-10-CM

## 2024-06-03 PROCEDURE — 3600007512: Performed by: INTERNAL MEDICINE

## 2024-06-03 PROCEDURE — 3600007502: Performed by: INTERNAL MEDICINE

## 2024-06-03 PROCEDURE — 7100000010 HC PHASE II RECOVERY - FIRST 15 MIN: Performed by: INTERNAL MEDICINE

## 2024-06-03 PROCEDURE — 99214 OFFICE O/P EST MOD 30 MIN: CPT | Performed by: PSYCHIATRY & NEUROLOGY

## 2024-06-03 PROCEDURE — C1726 CATH, BAL DIL, NON-VASCULAR: HCPCS | Performed by: INTERNAL MEDICINE

## 2024-06-03 PROCEDURE — 2709999900 HC NON-CHARGEABLE SUPPLY: Performed by: INTERNAL MEDICINE

## 2024-06-03 RX ORDER — M-VIT,TX,IRON,MINS/CALC/FOLIC 27MG-0.4MG
1 TABLET ORAL DAILY
COMMUNITY

## 2024-06-03 ASSESSMENT — PAIN - FUNCTIONAL ASSESSMENT
PAIN_FUNCTIONAL_ASSESSMENT: NONE - DENIES PAIN
PAIN_FUNCTIONAL_ASSESSMENT: NONE - DENIES PAIN

## 2024-06-03 ASSESSMENT — PATIENT HEALTH QUESTIONNAIRE - PHQ9
SUM OF ALL RESPONSES TO PHQ QUESTIONS 1-9: 0
2. FEELING DOWN, DEPRESSED OR HOPELESS: NOT AT ALL
1. LITTLE INTEREST OR PLEASURE IN DOING THINGS: NOT AT ALL
SUM OF ALL RESPONSES TO PHQ QUESTIONS 1-9: 0
SUM OF ALL RESPONSES TO PHQ QUESTIONS 1-9: 0
SUM OF ALL RESPONSES TO PHQ9 QUESTIONS 1 & 2: 0
SUM OF ALL RESPONSES TO PHQ QUESTIONS 1-9: 0

## 2024-06-03 NOTE — PROGRESS NOTES
Consult  REFERRED BY:  Avtar Cronin MD    CHIEF COMPLAINT: Patient with new problem of wanting to know how she can get diagnosed with Silvio-Danlos syndrome, and to review her tests, and for numbness and tingling in her arms and legs, pulsatile tinnitus in the right ear, and abnormal MRI scan of the brain in the past.      Subjective:     Yamini Torres is a 44 y.o. left-handed  female we are seeing at the request of Dr. Cronin for her MRI results and x-ray of the cervical spine results, but this x-ray of the cervical spine has not been read so we called radiology and asked them to read it, and went over the MRIs of the brain that do show at least 2 white matter lesions without any enhancement and they are stable with her previous description of white matter lesions, and the brain, but the neck shows no white matter lesions and most of her symptoms are in the arms we suggested MRI of the thoracic spine but she does not want to do that because she does not have any problems there.  She also had a normal EMG study of her arms and legs and no other evidence of muscle disease, and all the rest of her blood studies were normal.  We referred her to the Silvio Danlos [employer] in [place] to see if they could give her the name of a specialist we could refer her to.  Patient previously seen for multiple neurological complaints that she has had for the last 20 years.  She apparently had some arm spasticity and weakness and numbness in her arms and legs, and was seen by Dr. Liam Albarran at [facility] 20 some years ago, and apparently was told that she had 2 lesions on her brain, but never had follow-up for that, and has no idea what they were.  She is also complaining of generalized weakness and fatigue, and feels that she was tried on 2 antidepressants thinking that was the cause of her symptoms, but then they found that her thyroid was low and she is currently on thyroid.  She still feels funny

## 2024-06-03 NOTE — DISCHARGE INSTRUCTIONS
Yamini Torres  [id number]  1980      MANOMETRY DISCHARGE INSTRUCTION    You may resume your regular diet as tolerated.  You may resume your normal daily activities.  Call your Physician if you have any complications or questions.    Discussed with the patient and all questioned fully answered. She will call me if any problems arise.

## 2024-06-03 NOTE — PROGRESS NOTES
Rectal exam done by DENIZ Mcnair.  Anal manometry catheter inserted into rectum.  Manometry procedure complete.  Catheter inserted into rectum.  Balloon filled with 40cc of luke warm H2O, and pt escorted to bathroom for 5 min expulsion test.  Pt was able to expel balloon.  Balloon deflated and catheter removed.   Pt tolerated procedures well.

## 2024-06-12 DIAGNOSIS — E03.9 ACQUIRED HYPOTHYROIDISM: ICD-10-CM

## 2024-07-18 ENCOUNTER — CLINICAL DOCUMENTATION (OUTPATIENT)
Age: 44
End: 2024-07-18

## 2024-07-18 NOTE — PROGRESS NOTES
Patient called and she had tight muscles everywhere woke up Sunday night with her throat so tight she had to gasp for breath.  In brief her ENT checked her out 2 days later and could not find anything wrong other than she was just generally tense and had tight muscles.  We offered her a muscle relaxant because [place] way to the airport but she says she cannot get it now we recommended her taking magnesium and we will document this for the chart but all her EMG studies were normal all her muscle enzymes were normal and there really does sound like she might just be anxious.  She is on her way to [place] and will call us when she gets back

## 2024-08-23 ENCOUNTER — TELEPHONE (OUTPATIENT)
Age: 44
End: 2024-08-23

## 2024-08-23 NOTE — TELEPHONE ENCOUNTER
Reached out to PT about a genetic counseling consult. PT is looking for testing for ashu-danos and informed PT that we unfortunately don't do the testing for that.

## 2024-08-29 ENCOUNTER — HOSPITAL ENCOUNTER (OUTPATIENT)
Facility: HOSPITAL | Age: 44
Discharge: HOME | End: 2024-08-29
Attending: OTOLARYNGOLOGY
Payer: COMMERCIAL

## 2024-08-29 DIAGNOSIS — K21.9 LARYNGOPHARYNGEAL REFLUX: ICD-10-CM

## 2024-08-29 DIAGNOSIS — R13.10 DYSPHAGIA, UNSPECIFIED TYPE: ICD-10-CM

## 2024-08-29 DIAGNOSIS — Z78.9 NON-SMOKER: ICD-10-CM

## 2024-08-29 PROCEDURE — 6360000004 HC RX CONTRAST MEDICATION

## 2024-08-29 PROCEDURE — 74220 X-RAY XM ESOPHAGUS 1CNTRST: CPT

## 2024-08-29 RX ADMIN — IOHEXOL 150 ML: 240 INJECTION, SOLUTION INTRATHECAL; INTRAVASCULAR; INTRAVENOUS; ORAL at 08:57

## 2025-02-24 RX ORDER — CHOLECALCIFEROL (VITAMIN D3) 50 MCG
1 TABLET ORAL DAILY
Qty: 30 TABLET | Refills: 11 | OUTPATIENT
Start: 2025-02-24

## 2025-04-14 ENCOUNTER — OFFICE VISIT (OUTPATIENT)
Age: 45
End: 2025-04-14
Payer: COMMERCIAL

## 2025-04-14 VITALS
TEMPERATURE: 98.4 F | WEIGHT: 129.8 LBS | HEART RATE: 95 BPM | SYSTOLIC BLOOD PRESSURE: 100 MMHG | BODY MASS INDEX: 19.67 KG/M2 | OXYGEN SATURATION: 99 % | HEIGHT: 68 IN | RESPIRATION RATE: 19 BRPM | DIASTOLIC BLOOD PRESSURE: 70 MMHG

## 2025-04-14 DIAGNOSIS — I65.23 BILATERAL CAROTID ARTERY STENOSIS: ICD-10-CM

## 2025-04-14 DIAGNOSIS — R20.0 NUMBNESS AND TINGLING OF BOTH UPPER EXTREMITIES: ICD-10-CM

## 2025-04-14 DIAGNOSIS — G90.A POTS (POSTURAL ORTHOSTATIC TACHYCARDIA SYNDROME): Primary | ICD-10-CM

## 2025-04-14 DIAGNOSIS — M47.22 CERVICAL RADICULOPATHY DUE TO DEGENERATIVE JOINT DISEASE OF SPINE: ICD-10-CM

## 2025-04-14 DIAGNOSIS — Q79.60 EHLERS-DANLOS SYNDROME: ICD-10-CM

## 2025-04-14 DIAGNOSIS — R20.2 NUMBNESS AND TINGLING OF BOTH UPPER EXTREMITIES: ICD-10-CM

## 2025-04-14 DIAGNOSIS — R20.0 NUMBNESS AND TINGLING OF BOTH LOWER EXTREMITIES: ICD-10-CM

## 2025-04-14 DIAGNOSIS — R20.2 NUMBNESS AND TINGLING OF BOTH LOWER EXTREMITIES: ICD-10-CM

## 2025-04-14 PROCEDURE — 99214 OFFICE O/P EST MOD 30 MIN: CPT | Performed by: PSYCHIATRY & NEUROLOGY

## 2025-04-14 RX ORDER — MAGNESIUM GLYCINATE 100 MG
TABLET ORAL
COMMUNITY

## 2025-04-14 ASSESSMENT — PATIENT HEALTH QUESTIONNAIRE - PHQ9
2. FEELING DOWN, DEPRESSED OR HOPELESS: NOT AT ALL
SUM OF ALL RESPONSES TO PHQ QUESTIONS 1-9: 0
1. LITTLE INTEREST OR PLEASURE IN DOING THINGS: NOT AT ALL
SUM OF ALL RESPONSES TO PHQ QUESTIONS 1-9: 0

## 2025-04-14 NOTE — PROGRESS NOTES
Consult  REFERRED BY:  Avtar Cronin MD    CHIEF COMPLAINT: Patient with new problem of having throat spasms last summer when she thinks her hyoid bone slipped out of this place, and she claims this happened in the past, and saw ENT but by the time she saw ENT she was getting back to normal so no other treatment was needed.  She also had apparently dislocated her right shoulder and is in physical therapy having that treated now and had an MRI of her shoulder done.  She also is seeking advice on how to get diagnosed with Silvio-Danlos syndrome, and I suggested [facility] and she said they do not do it anymore but [facility] does and her PCP has referred her there if she just has not called yet so I recommended she do that.  I advised her that the POTS syndrome and Silvio-Danlos syndrome are not really neurologic problems and I reassured her that she does not have neuropathy from her Silvio-Danlos or legs because her EMG last fall was negative.  We spent a long time counseling her recommended she contact the Silvio-Danlos [employer] for their advice and talk to patients who have it to find a therapist for skills in dealing with it.  Patient also seen for wanting to know how she can get diagnosed with Silvio-Danlos syndrome, and to review her tests, and for numbness and tingling in her arms and legs, pulsatile tinnitus in the right ear, and abnormal MRI scan of the brain in the past.      Subjective:     Yamini Torres is a 45 y.o. left-handed  female we are seeing at the request of Dr. Cronin for new problem of Patient with new problem of having throat spasms last summer when she thinks her hyoid bone slipped out of this place, and she claims this happened in the past, and saw ENT but by the time she saw ENT she was getting back to normal so no other treatment was needed.  She also had apparently dislocated her right shoulder and is in physical therapy having that treated now and had an MRI of

## 2025-05-02 ENCOUNTER — TELEPHONE (OUTPATIENT)
Age: 45
End: 2025-05-02

## 2025-05-02 NOTE — TELEPHONE ENCOUNTER
Patient is scheduled for sx on 5/27/25 with Dr Brad Ibrahim from Authorization department called to inform sx  that the authorization was denied. Patients sx, procedure code# 01329 was Not medically necessary. [place] call back number [phone] exten [phone]

## 2025-05-22 RX ORDER — ERGOCALCIFEROL (VITAMIN D2) 10 MCG
400 TABLET ORAL DAILY
COMMUNITY

## 2025-05-22 RX ORDER — CALCIUM CARBONATE 500(1250)
500 TABLET ORAL DAILY
COMMUNITY

## 2025-05-23 ENCOUNTER — ANESTHESIA EVENT (OUTPATIENT)
Facility: HOSPITAL | Age: 45
End: 2025-05-23
Payer: COMMERCIAL

## 2025-05-27 ENCOUNTER — HOSPITAL ENCOUNTER (OUTPATIENT)
Facility: HOSPITAL | Age: 45
Setting detail: OUTPATIENT SURGERY
Discharge: HOME | End: 2025-05-27
Attending: ORTHOPAEDIC SURGERY | Admitting: ORTHOPAEDIC SURGERY
Payer: COMMERCIAL

## 2025-05-27 ENCOUNTER — ANESTHESIA (OUTPATIENT)
Facility: HOSPITAL | Age: 45
End: 2025-05-27
Payer: COMMERCIAL

## 2025-05-27 VITALS
HEIGHT: 68 IN | SYSTOLIC BLOOD PRESSURE: 151 MMHG | BODY MASS INDEX: 19.85 KG/M2 | RESPIRATION RATE: 18 BRPM | OXYGEN SATURATION: 100 % | TEMPERATURE: 97.8 F | HEART RATE: 67 BPM | WEIGHT: 131 LBS | DIASTOLIC BLOOD PRESSURE: 85 MMHG

## 2025-05-27 DIAGNOSIS — S43.431D LABRAL TEAR OF SHOULDER, RIGHT, SUBSEQUENT ENCOUNTER: Primary | ICD-10-CM

## 2025-05-27 PROCEDURE — 6360000002 HC RX W HCPCS: Performed by: NURSE ANESTHETIST, CERTIFIED REGISTERED

## 2025-05-27 PROCEDURE — 6360000002 HC RX W HCPCS: Performed by: ANESTHESIOLOGY

## 2025-05-27 PROCEDURE — 3700000001 HC ADD 15 MINUTES (ANESTHESIA): Performed by: ORTHOPAEDIC SURGERY

## 2025-05-27 PROCEDURE — 7100000011 HC PHASE II RECOVERY - ADDTL 15 MIN: Performed by: ORTHOPAEDIC SURGERY

## 2025-05-27 PROCEDURE — 6370000000 HC RX 637 (ALT 250 FOR IP): Performed by: ORTHOPAEDIC SURGERY

## 2025-05-27 PROCEDURE — 3600000014 HC SURGERY LEVEL 4 ADDTL 15MIN: Performed by: ORTHOPAEDIC SURGERY

## 2025-05-27 PROCEDURE — 3600000004 HC SURGERY LEVEL 4 BASE: Performed by: ORTHOPAEDIC SURGERY

## 2025-05-27 PROCEDURE — 6360000002 HC RX W HCPCS: Performed by: ORTHOPAEDIC SURGERY

## 2025-05-27 PROCEDURE — C1776 JOINT DEVICE (IMPLANTABLE): HCPCS | Performed by: ORTHOPAEDIC SURGERY

## 2025-05-27 PROCEDURE — 2580000003 HC RX 258: Performed by: NURSE ANESTHETIST, CERTIFIED REGISTERED

## 2025-05-27 PROCEDURE — 6370000000 HC RX 637 (ALT 250 FOR IP): Performed by: ANESTHESIOLOGY

## 2025-05-27 PROCEDURE — 6370000000 HC RX 637 (ALT 250 FOR IP)

## 2025-05-27 PROCEDURE — 7100000010 HC PHASE II RECOVERY - FIRST 15 MIN: Performed by: ORTHOPAEDIC SURGERY

## 2025-05-27 PROCEDURE — 7100000000 HC PACU RECOVERY - FIRST 15 MIN: Performed by: ORTHOPAEDIC SURGERY

## 2025-05-27 PROCEDURE — 3700000000 HC ANESTHESIA ATTENDED CARE: Performed by: ORTHOPAEDIC SURGERY

## 2025-05-27 PROCEDURE — 2709999900 HC NON-CHARGEABLE SUPPLY: Performed by: ORTHOPAEDIC SURGERY

## 2025-05-27 PROCEDURE — 2500000003 HC RX 250 WO HCPCS: Performed by: ORTHOPAEDIC SURGERY

## 2025-05-27 PROCEDURE — 2580000003 HC RX 258: Performed by: ANESTHESIOLOGY

## 2025-05-27 PROCEDURE — 7100000001 HC PACU RECOVERY - ADDTL 15 MIN: Performed by: ORTHOPAEDIC SURGERY

## 2025-05-27 PROCEDURE — 2720000010 HC SURG SUPPLY STERILE: Performed by: ORTHOPAEDIC SURGERY

## 2025-05-27 PROCEDURE — 2500000003 HC RX 250 WO HCPCS: Performed by: NURSE ANESTHETIST, CERTIFIED REGISTERED

## 2025-05-27 DEVICE — PROXIMAL TENODESIS IMPLANT SYSTEM REV: 0
Type: IMPLANTABLE DEVICE | Site: SHOULDER | Status: FUNCTIONAL
Brand: ARTHREX®

## 2025-05-27 RX ORDER — SODIUM CHLORIDE 0.9 % (FLUSH) 0.9 %
5-40 SYRINGE (ML) INJECTION PRN
Status: DISCONTINUED | OUTPATIENT
Start: 2025-05-27 | End: 2025-05-27 | Stop reason: HOSPADM

## 2025-05-27 RX ORDER — OXYCODONE HYDROCHLORIDE 5 MG/1
TABLET ORAL
Status: DISCONTINUED
Start: 2025-05-27 | End: 2025-05-27 | Stop reason: HOSPADM

## 2025-05-27 RX ORDER — CEFAZOLIN SODIUM 1 G/3ML
INJECTION, POWDER, FOR SOLUTION INTRAMUSCULAR; INTRAVENOUS
Status: COMPLETED
Start: 2025-05-27 | End: 2025-05-27

## 2025-05-27 RX ORDER — HYDROMORPHONE HYDROCHLORIDE 1 MG/ML
0.5 INJECTION, SOLUTION INTRAMUSCULAR; INTRAVENOUS; SUBCUTANEOUS EVERY 5 MIN PRN
Status: DISCONTINUED | OUTPATIENT
Start: 2025-05-27 | End: 2025-05-27 | Stop reason: HOSPADM

## 2025-05-27 RX ORDER — KETOROLAC TROMETHAMINE 30 MG/ML
30 INJECTION, SOLUTION INTRAMUSCULAR; INTRAVENOUS
Status: COMPLETED | OUTPATIENT
Start: 2025-05-27 | End: 2025-05-27

## 2025-05-27 RX ORDER — ACETAMINOPHEN 500 MG
TABLET ORAL
Status: DISCONTINUED
Start: 2025-05-27 | End: 2025-05-27 | Stop reason: HOSPADM

## 2025-05-27 RX ORDER — ROPIVACAINE HYDROCHLORIDE 5 MG/ML
INJECTION, SOLUTION EPIDURAL; INFILTRATION; PERINEURAL PRN
Status: DISCONTINUED | OUTPATIENT
Start: 2025-05-27 | End: 2025-05-27 | Stop reason: ALTCHOICE

## 2025-05-27 RX ORDER — LIDOCAINE HYDROCHLORIDE 10 MG/ML
1 INJECTION, SOLUTION EPIDURAL; INFILTRATION; INTRACAUDAL; PERINEURAL
Status: DISCONTINUED | OUTPATIENT
Start: 2025-05-27 | End: 2025-05-27 | Stop reason: HOSPADM

## 2025-05-27 RX ORDER — DROPERIDOL 2.5 MG/ML
0.62 INJECTION, SOLUTION INTRAMUSCULAR; INTRAVENOUS
Status: DISCONTINUED | OUTPATIENT
Start: 2025-05-27 | End: 2025-05-27 | Stop reason: HOSPADM

## 2025-05-27 RX ORDER — LIDOCAINE HYDROCHLORIDE 20 MG/ML
INJECTION, SOLUTION EPIDURAL; INFILTRATION; INTRACAUDAL; PERINEURAL
Status: DISCONTINUED | OUTPATIENT
Start: 2025-05-27 | End: 2025-05-27 | Stop reason: SDUPTHER

## 2025-05-27 RX ORDER — WATER 10 ML/10ML
INJECTION INTRAMUSCULAR; INTRAVENOUS; SUBCUTANEOUS
Status: DISCONTINUED
Start: 2025-05-27 | End: 2025-05-27 | Stop reason: HOSPADM

## 2025-05-27 RX ORDER — CEFAZOLIN SODIUM 1 G/3ML
INJECTION, POWDER, FOR SOLUTION INTRAMUSCULAR; INTRAVENOUS
Status: DISCONTINUED | OUTPATIENT
Start: 2025-05-27 | End: 2025-05-27 | Stop reason: SDUPTHER

## 2025-05-27 RX ORDER — DEXAMETHASONE SODIUM PHOSPHATE 4 MG/ML
INJECTION, SOLUTION INTRA-ARTICULAR; INTRALESIONAL; INTRAMUSCULAR; INTRAVENOUS; SOFT TISSUE
Status: DISCONTINUED | OUTPATIENT
Start: 2025-05-27 | End: 2025-05-27 | Stop reason: SDUPTHER

## 2025-05-27 RX ORDER — MEPERIDINE HYDROCHLORIDE 25 MG/ML
12.5 INJECTION INTRAMUSCULAR; INTRAVENOUS; SUBCUTANEOUS EVERY 5 MIN PRN
Status: DISCONTINUED | OUTPATIENT
Start: 2025-05-27 | End: 2025-05-27 | Stop reason: HOSPADM

## 2025-05-27 RX ORDER — SODIUM CHLORIDE, SODIUM LACTATE, POTASSIUM CHLORIDE, CALCIUM CHLORIDE 600; 310; 30; 20 MG/100ML; MG/100ML; MG/100ML; MG/100ML
INJECTION, SOLUTION INTRAVENOUS
Status: DISCONTINUED | OUTPATIENT
Start: 2025-05-27 | End: 2025-05-27 | Stop reason: SDUPTHER

## 2025-05-27 RX ORDER — KETOROLAC TROMETHAMINE 30 MG/ML
INJECTION, SOLUTION INTRAMUSCULAR; INTRAVENOUS
Status: DISCONTINUED
Start: 2025-05-27 | End: 2025-05-27 | Stop reason: HOSPADM

## 2025-05-27 RX ORDER — DIPHENHYDRAMINE HYDROCHLORIDE 50 MG/ML
12.5 INJECTION, SOLUTION INTRAMUSCULAR; INTRAVENOUS
Status: DISCONTINUED | OUTPATIENT
Start: 2025-05-27 | End: 2025-05-27 | Stop reason: HOSPADM

## 2025-05-27 RX ORDER — FENTANYL CITRATE 50 UG/ML
INJECTION, SOLUTION INTRAMUSCULAR; INTRAVENOUS
Status: DISCONTINUED | OUTPATIENT
Start: 2025-05-27 | End: 2025-05-27 | Stop reason: SDUPTHER

## 2025-05-27 RX ORDER — SODIUM CHLORIDE 9 MG/ML
INJECTION, SOLUTION INTRAVENOUS PRN
Status: DISCONTINUED | OUTPATIENT
Start: 2025-05-27 | End: 2025-05-27 | Stop reason: HOSPADM

## 2025-05-27 RX ORDER — ONDANSETRON 2 MG/ML
INJECTION INTRAMUSCULAR; INTRAVENOUS
Status: DISCONTINUED | OUTPATIENT
Start: 2025-05-27 | End: 2025-05-27 | Stop reason: SDUPTHER

## 2025-05-27 RX ORDER — DEXMEDETOMIDINE HYDROCHLORIDE 100 UG/ML
INJECTION, SOLUTION INTRAVENOUS
Status: DISCONTINUED | OUTPATIENT
Start: 2025-05-27 | End: 2025-05-27 | Stop reason: SDUPTHER

## 2025-05-27 RX ORDER — ROPIVACAINE HYDROCHLORIDE 5 MG/ML
INJECTION, SOLUTION EPIDURAL; INFILTRATION; PERINEURAL
Status: DISCONTINUED
Start: 2025-05-27 | End: 2025-05-27 | Stop reason: WASHOUT

## 2025-05-27 RX ORDER — MIDAZOLAM HYDROCHLORIDE 1 MG/ML
INJECTION, SOLUTION INTRAMUSCULAR; INTRAVENOUS
Status: DISCONTINUED | OUTPATIENT
Start: 2025-05-27 | End: 2025-05-27 | Stop reason: SDUPTHER

## 2025-05-27 RX ORDER — MECLIZINE HYDROCHLORIDE 25 MG/1
25 TABLET ORAL ONCE
Status: COMPLETED | OUTPATIENT
Start: 2025-05-27 | End: 2025-05-27

## 2025-05-27 RX ORDER — MIDAZOLAM HYDROCHLORIDE 1 MG/ML
INJECTION, SOLUTION INTRAMUSCULAR; INTRAVENOUS
Status: DISCONTINUED
Start: 2025-05-27 | End: 2025-05-27 | Stop reason: WASHOUT

## 2025-05-27 RX ORDER — NALOXONE HYDROCHLORIDE 0.4 MG/ML
INJECTION, SOLUTION INTRAMUSCULAR; INTRAVENOUS; SUBCUTANEOUS PRN
Status: DISCONTINUED | OUTPATIENT
Start: 2025-05-27 | End: 2025-05-27 | Stop reason: HOSPADM

## 2025-05-27 RX ORDER — ROCURONIUM BROMIDE 10 MG/ML
INJECTION, SOLUTION INTRAVENOUS
Status: DISCONTINUED | OUTPATIENT
Start: 2025-05-27 | End: 2025-05-27 | Stop reason: SDUPTHER

## 2025-05-27 RX ORDER — OXYCODONE HYDROCHLORIDE 5 MG/1
5 TABLET ORAL
Status: COMPLETED | OUTPATIENT
Start: 2025-05-27 | End: 2025-05-27

## 2025-05-27 RX ORDER — OXYCODONE HYDROCHLORIDE 5 MG/1
5 TABLET ORAL EVERY 4 HOURS PRN
Qty: 28 TABLET | Refills: 0 | Status: SHIPPED | OUTPATIENT
Start: 2025-05-27 | End: 2025-06-03

## 2025-05-27 RX ORDER — FENTANYL CITRATE 50 UG/ML
25 INJECTION, SOLUTION INTRAMUSCULAR; INTRAVENOUS EVERY 5 MIN PRN
Status: DISCONTINUED | OUTPATIENT
Start: 2025-05-27 | End: 2025-05-27 | Stop reason: HOSPADM

## 2025-05-27 RX ORDER — ACETAMINOPHEN 500 MG
1000 TABLET ORAL ONCE
Status: COMPLETED | OUTPATIENT
Start: 2025-05-27 | End: 2025-05-27

## 2025-05-27 RX ORDER — MECLIZINE HYDROCHLORIDE 25 MG/1
TABLET ORAL
Status: COMPLETED
Start: 2025-05-27 | End: 2025-05-27

## 2025-05-27 RX ORDER — SODIUM CHLORIDE, SODIUM LACTATE, POTASSIUM CHLORIDE, CALCIUM CHLORIDE 600; 310; 30; 20 MG/100ML; MG/100ML; MG/100ML; MG/100ML
INJECTION, SOLUTION INTRAVENOUS CONTINUOUS
Status: DISCONTINUED | OUTPATIENT
Start: 2025-05-27 | End: 2025-05-27 | Stop reason: HOSPADM

## 2025-05-27 RX ADMIN — MECLIZINE HYDROCHLORIDE 25 MG: 25 TABLET ORAL at 10:13

## 2025-05-27 RX ADMIN — OXYCODONE HYDROCHLORIDE 5 MG: 5 TABLET ORAL at 12:32

## 2025-05-27 RX ADMIN — ROCURONIUM BROMIDE 5 MG: 10 INJECTION INTRAVENOUS at 10:42

## 2025-05-27 RX ADMIN — KETOROLAC TROMETHAMINE 30 MG: 30 INJECTION, SOLUTION INTRAMUSCULAR at 11:55

## 2025-05-27 RX ADMIN — HYDROMORPHONE HYDROCHLORIDE 0.4 MG: 1 INJECTION, SOLUTION INTRAMUSCULAR; INTRAVENOUS; SUBCUTANEOUS at 11:58

## 2025-05-27 RX ADMIN — SODIUM CHLORIDE, SODIUM LACTATE, POTASSIUM CHLORIDE, AND CALCIUM CHLORIDE: .6; .31; .03; .02 INJECTION, SOLUTION INTRAVENOUS at 09:16

## 2025-05-27 RX ADMIN — CEFAZOLIN 2 G: 1 INJECTION, POWDER, FOR SOLUTION INTRAMUSCULAR; INTRAVENOUS; PARENTERAL at 10:53

## 2025-05-27 RX ADMIN — MIDAZOLAM HYDROCHLORIDE 2 MG: 1 INJECTION, SOLUTION INTRAMUSCULAR; INTRAVENOUS at 10:31

## 2025-05-27 RX ADMIN — DEXAMETHASONE SODIUM PHOSPHATE 10 MG: 4 INJECTION, SOLUTION INTRAMUSCULAR; INTRAVENOUS at 10:42

## 2025-05-27 RX ADMIN — LIDOCAINE HYDROCHLORIDE 80 MG: 20 INJECTION, SOLUTION EPIDURAL; INFILTRATION; INTRACAUDAL; PERINEURAL at 10:42

## 2025-05-27 RX ADMIN — PROPOFOL 100 MG: 10 INJECTION, EMULSION INTRAVENOUS at 10:42

## 2025-05-27 RX ADMIN — ONDANSETRON 4 MG: 2 INJECTION, SOLUTION INTRAMUSCULAR; INTRAVENOUS at 10:42

## 2025-05-27 RX ADMIN — DEXMEDETOMIDINE 10 MCG: 100 INJECTION, SOLUTION INTRAVENOUS at 10:50

## 2025-05-27 RX ADMIN — HYDROMORPHONE HYDROCHLORIDE 0.2 MG: 1 INJECTION, SOLUTION INTRAMUSCULAR; INTRAVENOUS; SUBCUTANEOUS at 11:11

## 2025-05-27 RX ADMIN — ROCURONIUM BROMIDE 40 MG: 10 INJECTION INTRAVENOUS at 10:49

## 2025-05-27 RX ADMIN — DEXMEDETOMIDINE 6 MCG: 100 INJECTION, SOLUTION INTRAVENOUS at 11:01

## 2025-05-27 RX ADMIN — HYDROMORPHONE HYDROCHLORIDE 0.2 MG: 1 INJECTION, SOLUTION INTRAMUSCULAR; INTRAVENOUS; SUBCUTANEOUS at 11:53

## 2025-05-27 RX ADMIN — HYDROMORPHONE HYDROCHLORIDE 0.2 MG: 1 INJECTION, SOLUTION INTRAMUSCULAR; INTRAVENOUS; SUBCUTANEOUS at 11:05

## 2025-05-27 RX ADMIN — FENTANYL CITRATE 50 MCG: 50 INJECTION, SOLUTION INTRAMUSCULAR; INTRAVENOUS at 10:42

## 2025-05-27 RX ADMIN — SUGAMMADEX 200 MG: 100 INJECTION, SOLUTION INTRAVENOUS at 11:42

## 2025-05-27 RX ADMIN — SODIUM CHLORIDE, POTASSIUM CHLORIDE, SODIUM LACTATE AND CALCIUM CHLORIDE: 600; 310; 30; 20 INJECTION, SOLUTION INTRAVENOUS at 10:33

## 2025-05-27 RX ADMIN — PROPOFOL 50 MCG/KG/MIN: 10 INJECTION, EMULSION INTRAVENOUS at 10:43

## 2025-05-27 RX ADMIN — Medication 1000 MG: at 12:13

## 2025-05-27 RX ADMIN — FENTANYL CITRATE 50 MCG: 50 INJECTION, SOLUTION INTRAMUSCULAR; INTRAVENOUS at 10:48

## 2025-05-27 ASSESSMENT — PAIN DESCRIPTION - ORIENTATION: ORIENTATION: RIGHT

## 2025-05-27 ASSESSMENT — PAIN DESCRIPTION - LOCATION: LOCATION: SHOULDER

## 2025-05-27 ASSESSMENT — PAIN SCALES - GENERAL
PAINLEVEL_OUTOF10: 7
PAINLEVEL_OUTOF10: 8
PAINLEVEL_OUTOF10: 5
PAINLEVEL_OUTOF10: 7

## 2025-05-27 ASSESSMENT — PAIN - FUNCTIONAL ASSESSMENT: PAIN_FUNCTIONAL_ASSESSMENT: 0-10

## 2025-05-27 ASSESSMENT — PAIN DESCRIPTION - DESCRIPTORS: DESCRIPTORS: ACHING

## 2025-05-27 NOTE — H&P
ORTHOPAEDIC H&P    Subjective:     Date of Consultation:  May 27, 2025      Yamini Torres is a 45 y.o. female who is being seen for right shoulder pain.  Workup has revealed labral tear, biceps tendinopathy.     Patient Active Problem List    Diagnosis Date Noted    POTS (postural orthostatic tachycardia syndrome) 06/03/2024    Silvio-Danlos syndrome 06/03/2024    Demyelinating disease of central nervous system (HCC) 03/27/2024    Numbness and tingling of both upper extremities 03/27/2024    Numbness and tingling of both lower extremities 03/27/2024    Constipation due to neurogenic bowel 02/13/2024    Cervical radiculopathy due to degenerative joint disease of spine 02/13/2024    Abnormal MRI of head 02/13/2024    Generalized weakness 02/13/2024    Tinnitus aurium, right 02/13/2024    Degenerative cervical spinal stenosis 02/13/2024    B12 deficiency 02/13/2024    Immune-mediated neuropathy 02/13/2024    Carpal tunnel syndrome, bilateral upper limbs 02/13/2024    Bilateral carotid artery stenosis 02/13/2024    Therapeutic drug monitoring 02/13/2024    Acquired hypothyroidism 02/12/2024    Vitamin D deficiency 02/12/2024    Pregnancy 03/23/2011     Family History   Problem Relation Age of Onset    Stroke Maternal Grandfather     Cancer Maternal Grandfather     Hypertension Father     Mult Sclerosis Mother     Cancer Paternal Grandfather       Social History     Tobacco Use    Smoking status: Never    Smokeless tobacco: Never   Substance Use Topics    Alcohol use: Yes     Comment: once in a while     Past Medical History:   Diagnosis Date    Acquired hypothyroidism     takes synthroid    Contact dermatitis     GERD (gastroesophageal reflux disease)     Hashimoto's disease       Past Surgical History:   Procedure Laterality Date    COLONOSCOPY      OTHER SURGICAL HISTORY      wisdom teeth    RECTAL SURGERY N/A 06/03/2024    ANAL RECTAL MONOMETRY WITH BALLOON EXPLUSION performed by Kacy Prasad MD at [facility]

## 2025-05-27 NOTE — PERIOP NOTE
1153 Pt arrived to PACU.  Pt very wiggly. Dressing on right shoulder CDI. Pt states she is having 7/10 pain in shoulder. CRNA gave dilaudid.  Dr. Lee at bedside.  She said we could give toradol. Right arm warm and pink.  Strong right radial pulse.      1156 Toradol given.     1215 Pt rates pain 8/10 but appears comfortable. Having a conversation and resting intermittently.  Pt does not want anymore IV pain medicine because she does not want to feel sleepy.  Agreed to do tylenol and a oxycodone to help with the pain.     1230 Pt ate crackers.  Oxycodone given.    1245 Dr. Somers spoke with pt at the bedside about the procedure.  Dr. Somers wants a simple sling on pt to go home with.     1315 Pt has been sleeping for the last 20 minutes. Appears comfortable but still rates pain 7/10.  Pt states she would like the sling on to provide more support for shoulder.    1330 Sling is on but pt does not feel well supported. Tightened sling as much as possible. Encouraged her to place a pillow under arm when she is home.  Dressing on right shoulder CDI.  Gel ice packs on.  Pt has glasses and purse with her.  Pt meets discharge criteria.  Ride aware pt has taken oxycodone in PACU and to wait until after 4:30 if another needed ot be take.   
Permission received to review discharge instructions and discuss private health information with ex  and will have someone with them after discharge   Patient states that family/friend will be with them for at least 24 hours following today's procedure.   Air Warming blanket placed on pt; turned on for comfort    glasses in PACU    
Yamini Torres  1980  [id number]    Situation:  Verbal report given from: KARLA Metcalf  Procedure: Procedure(s):  RIGHT SHOULDER ARTHROSCOPY WITH EXTENSIVE DEBRIDEMENT AND OPEN  BICEPS TENODESIS (GEN/BLOCK)    Background:    Preoperative diagnosis: Labral tear of shoulder, right, subsequent encounter [F11.399Z]    Postoperative diagnosis: * No post-op diagnosis entered *    :  Dr. Somers    Assistant(s): Circulator: Julianna Penaloza RN  Scrub Person First: Joesph Brizuela  Physician Assistant: Kirsten Oseguera PA-C    Specimens: * No specimens in log *    Assessment:  Intra-procedure medications         Anesthesia gave intra-procedure sedation and medications, see anesthesia flow sheet     Intravenous fluids: LR@ KVO     Vital signs stable       Recommendation:    Permission to share finding with family     
surgery.  Sleep on clean, freshly washed sheets.  Do not allow pets to sleep in your bed with you.        The Morning of your surgery:  Shower again thoroughly with an antibacterial soap, such as Dial or the soap provided at your preassessment appointment. If needed, ask someone for help to reach all areas of your body. Don’t forget to clean your belly button! Rinse.  With bottle of Hibiclens/Chlorhexidine in hand, turn water off.  Apply Hibiclens antiseptic skin cleanser with a clean, freshly washed washcloth.  Gently apply to your body from chin to toes (except the genital area) and especially the area(s) where your incision(s) will be.  Leave Hibiclens/Chlorhexidine on your skin for at least 20 seconds.     CAUTION: If needed, Hibiclens/chlorhexidine may be used to clean the folds of skin of the legs (such as in the area of the groin) and on your buttocks and hips. However, do not use Hibiclens/Chlorhexidine above the neck or in the genital area (your bottom) or put inside any area of your body.  Turn the water back on and rinse.  Dry gently with a clean, freshly washed towel.  After your shower, do not use any powder, deodorant, perfumes or lotion prior to surgery.  Put on clean, freshly washed clothing.    Tips to help prevent infections after your surgery:  Protect your surgical wound from germs:  Hand washing is the most important thing you and your caregivers can do to prevent infections.  Keep your bandage clean and dry!  Do not touch your surgical wound.  Use clean, freshly washed towels and washcloths every time you shower; do not share bath linens with others.  Until your surgical wound is healed, wear clothing and sleep on bed linens each day that are clean and freshly washed.  Do not allow pets to sleep in your bed with you or touch your surgical wound.  Do not smoke - smoking delays wound healing. This may be a good time to stop smoking.  If you have diabetes, it is important for you to manage your

## 2025-05-27 NOTE — OP NOTE
Operative Note      Patient: Yamini Torres  YOB: 1980  MRN: [id number]    Date of Procedure: 5/27/2025    Pre-Op Diagnosis Codes:      * Superior Labral tear of shoulder, right, subsequent encounter       Right biceps tendinopathy, right shoulder adhesive capsulitis with small labral tear    Post-Op Diagnosis: Same       Procedure(s):  RIGHT SHOULDER ARTHROSCOPY WITH EXTENSIVE DEBRIDEMENT AND OPEN  BICEPS TENODESIS (GEN/BLOCK)    Surgeon(s):  Santhosh Somers DO    Assistant:   Physician Assistant: Kirsten Oseguera PA-C    Anesthesia: General    Estimated Blood Loss (mL): Minimal    Complications: None    Specimens:   * No specimens in log *    Implants:  Implant Name Type Inv. Item Serial No.  Lot No. LRB No. Used Action   KIT IMPL SYS PROX TENODESIS W/ BICEPSBUTTON INSRT FIBERLOOP - SN/A  KIT IMPL SYS PROX TENODESIS W/ BICEPSBUTTON INSRT FIBERLOOP N/A [employer] [id number] Right 1 Implanted         Drains: * No LDAs found *    Findings:  Infection Present At Time Of Surgery (PATOS) (choose all levels that have infection present):  No infection present  Other Findings: see dictation    Detailed Description of Procedure:       Indication:  This patient has a symptomatic shoulder that comes in for arthroscopic treatment.  Patient has failed nonsurgical treatment.  This is a complex surgical procedure requiring an assistant for patient positioning, for wound closure, but critically for what is a 4 handed operation requiring the assistant to hold the arthroscope and retractors.  Also for cannula stabilization and for suture management.  Kirsten Oseguera was present for the entirety of the procedure and was vital for the performance of the procedure.  She assisted with positioning, prepping, draping of the patient before the procedure and instrument manipulation and extremity repositioning during the procedure as well as wound closure, dressing application and brace

## 2025-05-27 NOTE — DISCHARGE INSTRUCTIONS
Shoulder Surgery:  Postoperative Instructions  Dr. Santhosh Somers  Diet   You may resume your regular diet as soon as possible    Medication   Take the pain medication as prescribed  Most of the time the pain medication prescribed is Oxycodone WITHOUT acetaminophen/tylenol.  If no allergies or contraindications, it is beneficial to take tylenol 1000mg every 8 hours as a baseline pain control.  Then the pain medication is taken as needed.  You can also take an anti-inflammatory like motrin/ibuprofen/aleve.  Take pain medication with food  While taking pain medications, you may NOT operate a vehicle, heavy machinery, or appliances  While taking pain medication, you may NOT drink alcoholic beverages  If you have any reactions to your medications, stop taking them and call my office  Please keep in mind that constipation is a very common side effect of taking narcotic pain medication.  Take precautions to prevent constipation:  Drink plenty of water (6-8 glasses of 8 oz. a day)  Avoid alcohol, caffeine, and dairy products  Eat plenty of fiber (fruits, vegetables, and whole grains)  Take an over the counter stool softener (Colace or Dulcolax)  Patients that have had upper extremity surgery should take frequent walks  >>>You received Toradol during your surgery. You may not take any form of NSAIDS (non steroidal anti inflammatory drugs) such as Advil, Ibuprofen, Aleve, Motrin until 5:55 pm.<<< If you can take Ibuprofen take prescribed dose as ordered on schedule with food.     >>>You received Tylenol 1000mg prior to your surgery, you may take Tylenol (or pain medication containing Tylenol or Acetaminophen) in 6 hours at 6:15 pm.<<<    Activity   Minimize activity the day of surgery   DO NOT USE HEAT   Please keep ice applied to the shoulder for the first 72 hours or as long as pain or swelling persists.  Do not apply ice directly to skin, or allow water to leak on your dressing.  Place a pillow behind your elbow while

## 2025-05-27 NOTE — ANESTHESIA POSTPROCEDURE EVALUATION
Department of Anesthesiology  Postprocedure Note    Patient: Yamini Torres  MRN: [id number]  YOB: 1980  Date of evaluation: 5/27/2025    Procedure Summary       Date: 05/27/25 Room / [facility] OR    Anesthesia Start: 1032 Anesthesia Stop: 1156    Procedure: RIGHT SHOULDER ARTHROSCOPY WITH EXTENSIVE DEBRIDEMENT AND OPEN  BICEPS TENODESIS (GEN/BLOCK) (Right: Shoulder) Diagnosis:       Labral tear of shoulder, right, subsequent encounter      (Labral tear of shoulder, right, subsequent encounter [S43.431D])    Surgeons: Santhosh Somers DO Responsible Provider: Xena Lee MD    Anesthesia Type: General ASA Status: 2            Anesthesia Type: General    Zaida Phase I: Zaida Score: 10    Zaida Phase II: Zaida Score: 10    Anesthesia Post Evaluation    Patient location during evaluation: PACU  Patient participation: complete - patient participated  Level of consciousness: awake and alert  Pain score: 7  Airway patency: patent  Nausea & Vomiting: no vomiting and no nausea  Cardiovascular status: blood pressure returned to baseline and hemodynamically stable  Respiratory status: acceptable  Hydration status: stable  Comments: Pt rating pain 7/10. Received multimodal analgesia and able to sleep. When awakened she says her shoulder \"just doesn't feel supported\". Placed in sling, but same complaint; I think this is surgical pain that she's feeling. She refused nerve block for pain control preop.  Multimodal analgesia pain management approach  Pain management: adequate    No notable events documented.

## 2025-05-27 NOTE — ANESTHESIA PRE PROCEDURE
disease)    • Hashimoto's disease    • Motion sickness    • Postural orthostatic tachycardia syndrome (POTS)    • Spinal stenosis     cervical       Past Surgical History:        Procedure Laterality Date   • COLONOSCOPY     • OTHER SURGICAL HISTORY      wisdom teeth   • RECTAL SURGERY N/A 06/03/2024    Anal Manometry performed in office w/o sedation       Social History:    Social History     Tobacco Use   • Smoking status: Never   • Smokeless tobacco: Never   Substance Use Topics   • Alcohol use: Yes     Comment: once in a while                                Counseling given: Not Answered      Vital Signs (Current):   Vitals:    05/22/25 1135 05/27/25 0908   BP:  (!) 158/107   Pulse:  87   Resp:  10   Temp:  98.6 °F (37 °C)   TempSrc:  Temporal   SpO2:  100%   Weight: 59 kg (130 lb) 59.4 kg (131 lb)   Height: 1.727 m (5' 8\")                                               BP Readings from Last 3 Encounters:   05/27/25 (!) 158/107   04/14/25 100/70   06/03/24 (!) 155/90       NPO Status: Time of last liquid consumption: 2230                        Time of last solid consumption: 1900                        Date of last liquid consumption: 05/27/25                        Date of last solid food consumption: 05/26/25    BMI:   Wt Readings from Last 3 Encounters:   05/27/25 59.4 kg (131 lb)   04/24/25 58.5 kg (129 lb)   04/14/25 58.9 kg (129 lb 12.8 oz)     Body mass index is 19.92 kg/m².    CBC: No results found for: \"WBC\", \"RBC\", \"HGB\", \"HCT\", \"MCV\", \"RDW\", \"PLT\"    CMP: No results found for: \"NA\", \"K\", \"CL\", \"CO2\", \"BUN\", \"CREATININE\", \"GFRAA\", \"AGRATIO\", \"LABGLOM\", \"GLUCOSE\", \"GLU\", \"CALCIUM\", \"BILITOT\", \"ALKPHOS\", \"AST\", \"ALT\"    POC Tests: No results for input(s): \"POCGLU\", \"POCNA\", \"POCK\", \"POCCL\", \"POCBUN\", \"POCHEMO\", \"POCHCT\" in the last 72 hours.    Coags: No results found for: \"PROTIME\", \"INR\", \"APTT\"    HCG (If Applicable): No results found for: \"PREGTESTUR\", \"PREGSERUM\", \"HCG\", \"HCGQUANT\"     ABGs: No

## (undated) DEVICE — GLOVE ORANGE PI 7 1/2   MSG9075

## (undated) DEVICE — PENCIL SMK EVAC ALL IN 1 DSGN ENH VISIBILITY IMPROVED AIR

## (undated) DEVICE — YANKAUER,BULB TIP,W/O VENT,RIGID,STERILE: Brand: MEDLINE

## (undated) DEVICE — GLOVE SURG SZ 7 L12IN FNGR THK79MIL GRN LTX FREE

## (undated) DEVICE — SOLUTION IRRIGATION LR 3000 ML USP TITAN XL CONTAINER 4/CA

## (undated) DEVICE — COVER LT HNDL PLAS RIG 1 PER PK

## (undated) DEVICE — GLOVE SURG SZ 65 THK91MIL LTX FREE SYN POLYISOPRENE

## (undated) DEVICE — SUTURE ETHILON SZ 3-0 L18IN NONABSORBABLE BLK L24MM PS-1 3/8 1663G

## (undated) DEVICE — GLOVE SURG SZ 65 L12IN FNGR THK79MIL GRN LTX FREE

## (undated) DEVICE — SYRINGE MEDICAL 3ML CLEAR PLASTIC STANDARD NON CONTROL LUERLOCK TIP DISPOSABLE

## (undated) DEVICE — SET IRRIG W 96IN TBNG 4 LN FLX BG

## (undated) DEVICE — 4.5 MM INCISOR STRAIGHT BLADES,                                    POWER/EP-1, LIME GREEN, PACKAGED 6                                    PER BOX, STERILE

## (undated) DEVICE — SUTURE VICRYL + SZ 2-0 L27IN ABSRB WHT SH 1/2 CIR TAPERCUT VCP417H

## (undated) DEVICE — DRESSING,GAUZE,XEROFORM,CURAD,1"X8",ST: Brand: CURAD

## (undated) DEVICE — GLOVE ORTHO 8   MSG9480

## (undated) DEVICE — LIQUIBAND RAPID ADHESIVE 36/CS 0.8ML: Brand: MEDLINE

## (undated) DEVICE — SUTURE VICRYL SZ 0 L36IN ABSRB UD L40MM CT 1/2 CIR TAPERPOINT J958H

## (undated) DEVICE — BLUNTFILL WITH FILTER: Brand: MONOJECT

## (undated) DEVICE — CATHETER BLLN DIL 26X48 MMX60 CM ANORECT BAROSTAT

## (undated) DEVICE — SHOULDER SUSPENSION KIT 6 PER BOX

## (undated) DEVICE — 4.0 MM ELITE ACROMIONIZER STRAIGHT                                    DISPOSABLE BURRS, MAUVE, 10000                                    MAXIMUM RPM, PACKAGED 6 PER BOX, STERILE

## (undated) DEVICE — SUTURE MONOCRYL STRATAFIX SPRL + SZ 3-0 L12IN ABSRB UD PS-2 SXMP1B106

## (undated) DEVICE — SHOULDER ARTHROSCOPY-MRMCASU: Brand: MEDLINE INDUSTRIES, INC.

## (undated) DEVICE — TUBING, SUCTION, 1/4" X 10', STRAIGHT: Brand: MEDLINE

## (undated) DEVICE — SHEATH CATH ANORECT MNOMTR

## (undated) DEVICE — CUFF BLD PRSS AD CLTH SGL TB W/ BAYNT CONN ROUNDED CORNER

## (undated) DEVICE — SUPER TURBOVAC 90 INTEGRATED CABLE WAND ICW: Brand: COBLATION

## (undated) DEVICE — GLOVE ORANGE PI 7   MSG9070